# Patient Record
Sex: MALE | Race: WHITE | NOT HISPANIC OR LATINO
[De-identification: names, ages, dates, MRNs, and addresses within clinical notes are randomized per-mention and may not be internally consistent; named-entity substitution may affect disease eponyms.]

---

## 2018-09-05 ENCOUNTER — APPOINTMENT (OUTPATIENT)
Dept: GASTROENTEROLOGY | Facility: CLINIC | Age: 63
End: 2018-09-05
Payer: COMMERCIAL

## 2018-09-05 VITALS
WEIGHT: 225 LBS | SYSTOLIC BLOOD PRESSURE: 130 MMHG | DIASTOLIC BLOOD PRESSURE: 80 MMHG | TEMPERATURE: 97.7 F | RESPIRATION RATE: 16 BRPM | HEIGHT: 70 IN | BODY MASS INDEX: 32.21 KG/M2 | HEART RATE: 71 BPM | OXYGEN SATURATION: 98 %

## 2018-09-05 DIAGNOSIS — D12.6 BENIGN NEOPLASM OF COLON, UNSPECIFIED: ICD-10-CM

## 2018-09-05 DIAGNOSIS — K63.5 POLYP OF COLON: ICD-10-CM

## 2018-09-05 PROCEDURE — 99204 OFFICE O/P NEW MOD 45 MIN: CPT

## 2018-10-09 ENCOUNTER — INPATIENT (INPATIENT)
Facility: HOSPITAL | Age: 63
LOS: 0 days | Discharge: LEFT BEFORE DISCHARGE | End: 2018-10-10
Attending: INTERNAL MEDICINE | Admitting: INTERNAL MEDICINE
Payer: COMMERCIAL

## 2018-10-09 VITALS
OXYGEN SATURATION: 100 % | SYSTOLIC BLOOD PRESSURE: 183 MMHG | DIASTOLIC BLOOD PRESSURE: 101 MMHG | TEMPERATURE: 98 F | HEART RATE: 81 BPM | RESPIRATION RATE: 16 BRPM

## 2018-10-09 DIAGNOSIS — K21.9 GASTRO-ESOPHAGEAL REFLUX DISEASE WITHOUT ESOPHAGITIS: ICD-10-CM

## 2018-10-09 DIAGNOSIS — Z29.9 ENCOUNTER FOR PROPHYLACTIC MEASURES, UNSPECIFIED: ICD-10-CM

## 2018-10-09 DIAGNOSIS — I10 ESSENTIAL (PRIMARY) HYPERTENSION: ICD-10-CM

## 2018-10-09 DIAGNOSIS — E78.5 HYPERLIPIDEMIA, UNSPECIFIED: ICD-10-CM

## 2018-10-09 DIAGNOSIS — R07.9 CHEST PAIN, UNSPECIFIED: ICD-10-CM

## 2018-10-09 DIAGNOSIS — Z98.89 OTHER SPECIFIED POSTPROCEDURAL STATES: Chronic | ICD-10-CM

## 2018-10-09 LAB
ALBUMIN SERPL ELPH-MCNC: 4.4 G/DL — SIGNIFICANT CHANGE UP (ref 3.3–5)
ALP SERPL-CCNC: 105 U/L — SIGNIFICANT CHANGE UP (ref 40–120)
ALT FLD-CCNC: 25 U/L — SIGNIFICANT CHANGE UP (ref 4–41)
APTT BLD: 30.3 SEC — SIGNIFICANT CHANGE UP (ref 27.5–37.4)
AST SERPL-CCNC: 19 U/L — SIGNIFICANT CHANGE UP (ref 4–40)
BASOPHILS # BLD AUTO: 0.07 K/UL — SIGNIFICANT CHANGE UP (ref 0–0.2)
BASOPHILS NFR BLD AUTO: 0.8 % — SIGNIFICANT CHANGE UP (ref 0–2)
BILIRUB SERPL-MCNC: 0.5 MG/DL — SIGNIFICANT CHANGE UP (ref 0.2–1.2)
BUN SERPL-MCNC: 18 MG/DL — SIGNIFICANT CHANGE UP (ref 7–23)
CALCIUM SERPL-MCNC: 9.4 MG/DL — SIGNIFICANT CHANGE UP (ref 8.4–10.5)
CHLORIDE SERPL-SCNC: 98 MMOL/L — SIGNIFICANT CHANGE UP (ref 98–107)
CK MB BLD-MCNC: 2.23 NG/ML — SIGNIFICANT CHANGE UP (ref 1–6.6)
CK SERPL-CCNC: 107 U/L — SIGNIFICANT CHANGE UP (ref 30–200)
CO2 SERPL-SCNC: 25 MMOL/L — SIGNIFICANT CHANGE UP (ref 22–31)
CREAT SERPL-MCNC: 0.98 MG/DL — SIGNIFICANT CHANGE UP (ref 0.5–1.3)
EOSINOPHIL # BLD AUTO: 0.03 K/UL — SIGNIFICANT CHANGE UP (ref 0–0.5)
EOSINOPHIL NFR BLD AUTO: 0.3 % — SIGNIFICANT CHANGE UP (ref 0–6)
GLUCOSE SERPL-MCNC: 112 MG/DL — HIGH (ref 70–99)
HCT VFR BLD CALC: 46.9 % — SIGNIFICANT CHANGE UP (ref 39–50)
HGB BLD-MCNC: 16.1 G/DL — SIGNIFICANT CHANGE UP (ref 13–17)
IMM GRANULOCYTES # BLD AUTO: 0.07 # — SIGNIFICANT CHANGE UP
IMM GRANULOCYTES NFR BLD AUTO: 0.8 % — SIGNIFICANT CHANGE UP (ref 0–1.5)
INR BLD: 1.01 — SIGNIFICANT CHANGE UP (ref 0.88–1.17)
LYMPHOCYTES # BLD AUTO: 1.71 K/UL — SIGNIFICANT CHANGE UP (ref 1–3.3)
LYMPHOCYTES # BLD AUTO: 18.7 % — SIGNIFICANT CHANGE UP (ref 13–44)
MCHC RBC-ENTMCNC: 29.8 PG — SIGNIFICANT CHANGE UP (ref 27–34)
MCHC RBC-ENTMCNC: 34.3 % — SIGNIFICANT CHANGE UP (ref 32–36)
MCV RBC AUTO: 86.9 FL — SIGNIFICANT CHANGE UP (ref 80–100)
MONOCYTES # BLD AUTO: 0.58 K/UL — SIGNIFICANT CHANGE UP (ref 0–0.9)
MONOCYTES NFR BLD AUTO: 6.3 % — SIGNIFICANT CHANGE UP (ref 2–14)
NEUTROPHILS # BLD AUTO: 6.68 K/UL — SIGNIFICANT CHANGE UP (ref 1.8–7.4)
NEUTROPHILS NFR BLD AUTO: 73.1 % — SIGNIFICANT CHANGE UP (ref 43–77)
NRBC # FLD: 0 — SIGNIFICANT CHANGE UP
PLATELET # BLD AUTO: 185 K/UL — SIGNIFICANT CHANGE UP (ref 150–400)
PMV BLD: 10.3 FL — SIGNIFICANT CHANGE UP (ref 7–13)
POTASSIUM SERPL-MCNC: 3.9 MMOL/L — SIGNIFICANT CHANGE UP (ref 3.5–5.3)
POTASSIUM SERPL-SCNC: 3.9 MMOL/L — SIGNIFICANT CHANGE UP (ref 3.5–5.3)
PROT SERPL-MCNC: 8.2 G/DL — SIGNIFICANT CHANGE UP (ref 6–8.3)
PROTHROM AB SERPL-ACNC: 11.2 SEC — SIGNIFICANT CHANGE UP (ref 9.8–13.1)
RBC # BLD: 5.4 M/UL — SIGNIFICANT CHANGE UP (ref 4.2–5.8)
RBC # FLD: 12.5 % — SIGNIFICANT CHANGE UP (ref 10.3–14.5)
SODIUM SERPL-SCNC: 136 MMOL/L — SIGNIFICANT CHANGE UP (ref 135–145)
TROPONIN T, HIGH SENSITIVITY: 18 NG/L — SIGNIFICANT CHANGE UP (ref ?–14)
TROPONIN T, HIGH SENSITIVITY: 19 NG/L — SIGNIFICANT CHANGE UP (ref ?–14)
WBC # BLD: 9.14 K/UL — SIGNIFICANT CHANGE UP (ref 3.8–10.5)
WBC # FLD AUTO: 9.14 K/UL — SIGNIFICANT CHANGE UP (ref 3.8–10.5)

## 2018-10-09 PROCEDURE — 71046 X-RAY EXAM CHEST 2 VIEWS: CPT | Mod: 26

## 2018-10-09 PROCEDURE — 74174 CTA ABD&PLVS W/CONTRAST: CPT | Mod: 26

## 2018-10-09 PROCEDURE — 71275 CT ANGIOGRAPHY CHEST: CPT | Mod: 26

## 2018-10-09 RX ORDER — ATORVASTATIN CALCIUM 80 MG/1
20 TABLET, FILM COATED ORAL AT BEDTIME
Qty: 0 | Refills: 0 | Status: DISCONTINUED | OUTPATIENT
Start: 2018-10-09 | End: 2018-10-10

## 2018-10-09 RX ORDER — ASPIRIN/CALCIUM CARB/MAGNESIUM 324 MG
81 TABLET ORAL DAILY
Qty: 0 | Refills: 0 | Status: DISCONTINUED | OUTPATIENT
Start: 2018-10-09 | End: 2018-10-10

## 2018-10-09 RX ORDER — ENOXAPARIN SODIUM 100 MG/ML
40 INJECTION SUBCUTANEOUS EVERY 24 HOURS
Qty: 0 | Refills: 0 | Status: DISCONTINUED | OUTPATIENT
Start: 2018-10-09 | End: 2018-10-10

## 2018-10-09 RX ORDER — ASPIRIN/CALCIUM CARB/MAGNESIUM 324 MG
162 TABLET ORAL ONCE
Qty: 0 | Refills: 0 | Status: COMPLETED | OUTPATIENT
Start: 2018-10-09 | End: 2018-10-09

## 2018-10-09 RX ORDER — NITROGLYCERIN 6.5 MG
0.4 CAPSULE, EXTENDED RELEASE ORAL ONCE
Qty: 0 | Refills: 0 | Status: COMPLETED | OUTPATIENT
Start: 2018-10-09 | End: 2018-10-09

## 2018-10-09 RX ORDER — FENTANYL CITRATE 50 UG/ML
50 INJECTION INTRAVENOUS ONCE
Qty: 0 | Refills: 0 | Status: DISCONTINUED | OUTPATIENT
Start: 2018-10-09 | End: 2018-10-09

## 2018-10-09 RX ORDER — PANTOPRAZOLE SODIUM 20 MG/1
40 TABLET, DELAYED RELEASE ORAL
Qty: 0 | Refills: 0 | Status: DISCONTINUED | OUTPATIENT
Start: 2018-10-09 | End: 2018-10-10

## 2018-10-09 RX ORDER — LOSARTAN POTASSIUM 100 MG/1
25 TABLET, FILM COATED ORAL DAILY
Qty: 0 | Refills: 0 | Status: DISCONTINUED | OUTPATIENT
Start: 2018-10-09 | End: 2018-10-10

## 2018-10-09 RX ADMIN — Medication 0.4 MILLIGRAM(S): at 22:00

## 2018-10-09 RX ADMIN — FENTANYL CITRATE 50 MICROGRAM(S): 50 INJECTION INTRAVENOUS at 20:06

## 2018-10-09 RX ADMIN — ATORVASTATIN CALCIUM 20 MILLIGRAM(S): 80 TABLET, FILM COATED ORAL at 22:00

## 2018-10-09 RX ADMIN — FENTANYL CITRATE 50 MICROGRAM(S): 50 INJECTION INTRAVENOUS at 18:36

## 2018-10-09 RX ADMIN — Medication 0.4 MILLIGRAM(S): at 20:06

## 2018-10-09 RX ADMIN — Medication 162 MILLIGRAM(S): at 18:22

## 2018-10-09 NOTE — H&P ADULT - FAMILY HISTORY
Family history of heart attack     Sibling  Still living? Yes, Estimated age: Age Unknown  Family history of CABG, Age at diagnosis: Age Unknown

## 2018-10-09 NOTE — ED PROVIDER NOTE - OBJECTIVE STATEMENT
63yom w/ HTN, HLD former smoker had an acute onset at rest of heaviness in the left side of his chest radiating to the left arm and shoulder, associated w/ nausea, diaphoresis and shortness of breath, symptoms worsened w/ exertion, persistent since onset. Last stress/cath about 5 years ago. Strong family hx of cardiac disease. No fevers, chills, cough or other recent illness.

## 2018-10-09 NOTE — H&P ADULT - HISTORY OF PRESENT ILLNESS
62 y/o male with a PMHx of HTN, HLD, and former smoker, with a strong family history of CAD, presents to ED with chest pain. Pt was at home watching television this afternoon when he developed acute onset, non-pleuritic, non-exertional, left sided chest pain radiating under left breast to the left arm and to the upper back. Pt cannot identify any provoking factors and says he was sitting down when the symptoms started. Pt describes the chest pain as a "pressure" and heaviness. Pt admits to associated shortness of breath, nausea and diaphoresis when his chest pain started. Pt took a baby Aspirin at home with mild alleviation of his symptoms, but they did not completely go away. Pt underwent cardiac cath 5 years ago, which was normal. He does not remember when his last stress test was but follows up with his cardiologist Dr. Long every 6 months because of his family history. Pt does state that he gets heartburn very often. Pt denies fever, chills, recent travel, headache, dizziness, visual deficits, orthopnea, palpitations, abdominal pain, V/D/C, hematochezia, melena, dysuria, hematuria, LOC, syncope, peripheral edema. Upon arrival to ED, EKG: NSR at 70 bpm with LAD. CE x2: Trop 18-->19. Pt was given Fentanyl and his symptoms improved.

## 2018-10-09 NOTE — ED ADULT NURSE NOTE - NSIMPLEMENTINTERV_GEN_ALL_ED
Implemented All Universal Safety Interventions:  [place] to call system. Call bell, personal items and telephone within reach. Instruct patient to call for assistance. Room bathroom lighting operational. Non-slip footwear when patient is off stretcher. Physically safe environment: no spills, clutter or unnecessary equipment. Stretcher in lowest position, wheels locked, appropriate side rails in place.

## 2018-10-09 NOTE — ED PROVIDER NOTE - ATTENDING CONTRIBUTION TO CARE
I, Jennifer Cabot, MD, have performed a history and physical exam of the patient and discussed their management with the resident. I reviewed the resident's note and agree with the documented findings and plan of care. My medical decision making and observations are found above.    Cabot: 63M with PMH Of HTN, HLD former smoker, neg cath 5 years ago, here with L sided CP starting while lying on the couch.  Associated with nausea and diaphoresis.  Exertional.  Has FH of MIs in the 40s in his father and MIs in his brother in his 50s.  On exam, HDS, NAD, MMM, eyes clear, lungs CTAB, heart sounds normal, abd soft, NT, ND, no CVAT, LEs without edema, wwp, skin normal temperature and color, neuro: alert and oriented, no focal deficits, pulses 2+ bilat.  EKG unchanged from prior.  Will send basic labs, coags, trop, CXR, ASA, tx pain, likely admit.

## 2018-10-09 NOTE — H&P ADULT - NEGATIVE CARDIOVASCULAR SYMPTOMS
no palpitations/no peripheral edema/no dyspnea on exertion/no orthopnea/no paroxysmal nocturnal dyspnea/no claudication

## 2018-10-09 NOTE — ED PROVIDER NOTE - MEDICAL DECISION MAKING DETAILS
Cabot: 63M with PMH Of HTN, HLD former smoker, neg cath 5 years ago, here with L sided CP starting while lying on the couch.  Associated with nausea and diaphoresis.  Exertional.  Has FH of MIs in the 40s in his father and MIs in his brother in his 50s.  On exam, HDS, NAD, MMM, eyes clear, lungs CTAB, heart sounds normal, abd soft, NT, ND, no CVAT, LEs without edema, wwp, skin normal temperature and color, neuro: alert and oriented, no focal deficits, pulses 2+ bilat.  EKG unchanged from prior.  Will send basic labs, coags, trop, CXR, ASA, tx pain, likely admit.

## 2018-10-09 NOTE — ED PROVIDER NOTE - CARE PLAN
Principal Discharge DX:	Chest pain  Secondary Diagnosis:	Shortness of breath  Secondary Diagnosis:	HTN (hypertension)

## 2018-10-09 NOTE — H&P ADULT - NEUROLOGICAL DETAILS
responds to verbal commands/cranial nerves intact/sensation intact/normal strength/responds to pain/alert and oriented x 3

## 2018-10-09 NOTE — ED ADULT NURSE REASSESSMENT NOTE - NS ED NURSE REASSESS COMMENT FT1
Pt complaining of sudden nausea. Tele SERGE Ram notified and advised to give Zofran and advised no repeat EKG needed at this time.
Report given to [facility] 2 ABELINO Serrato.
Upon reassessment, pt complaining of left-sided dull chest pain. Lung sounds are clear. Pt denies SOB, difficulty breathing, N&V, or ABD pain at this time.
Report received from keren Zurita. Pt In room 1. Pt states no relief from first dose of nitro at this time. Pt denies SOB, difficulty breathing, N&V at this time.
upon reassessment, pt states chest pain has been partially relieved and he is more comfortable when laying down. Pt also states he is no longer nauseas.

## 2018-10-09 NOTE — H&P ADULT - RS GEN PE MLT RESP DETAILS PC
airway patent/good air movement/no wheezes/respirations non-labored/clear to auscultation bilaterally/breath sounds equal/no intercostal retractions/no rales/no chest wall tenderness/no rhonchi

## 2018-10-09 NOTE — H&P ADULT - ASSESSMENT
62 y/o male with a PMHx of HTN, HLD, and former smoker, with a strong family history of CAD, presents to ED with chest pain.

## 2018-10-09 NOTE — H&P ADULT - NEGATIVE GASTROINTESTINAL SYMPTOMS
no melena/no hematochezia/no abdominal pain/no flatulence/no constipation/no vomiting/no diarrhea/no change in bowel habits

## 2018-10-09 NOTE — H&P ADULT - PROBLEM SELECTOR PLAN 1
Admit to telemetry, serial cardiac enzymes, serial EKGs  Check CBC, CMP, TSH, FLP, HgA1C  Chest pain atypical  Cardiac cath from 2013 shows normal coronary arteries  CT angio chest ordered by ED; will follow up results  Will call cardiology consult with Dr. Long in AM  Further ischemic workup as per cardiology  F/U MD note

## 2018-10-09 NOTE — ED ADULT NURSE NOTE - OBJECTIVE STATEMENT
pt received to , aox3, pt c/o chest pain since yesterday, worse upon movement. SL placed, labs sent, v/s as noted.  MD at bedside.  awaiting further orders, nathalia barnett

## 2018-10-09 NOTE — H&P ADULT - ATTENDING COMMENTS
seen and examined  above HPI, PMH, SH, FH, Meds, Allergies and ROS noted and personally confirmed as accurate by me  PE as above  PHYSICAL EXAM: vital signs as above  in no apparent distress  HEENT: JOSE EOMI  Neck: Supple, no JVD, no thyromegaly  Lungs: no rhonchi, no wheeze, no crackles  CVS: S1 S2 no M/R/G  Abdomen: no tenderness, no organomegaly, BS present  Neuro: AO x 3 no focal weakness, no sensory abnormalities  Psych: appropriate affect  Skin: warm, dry  Ext: no cyanosis or clubbing, no edema  Msk: no joint swelling or deformities  Back: no CVA tenderness, no kyphosis/scoliosis       d/w Dr Long cardiology   high risk for CAD  discharge home after stress if negative    discussed with patient in detail, all questions answered   discussed with wife at bedside in detail

## 2018-10-09 NOTE — ED ADULT TRIAGE NOTE - CHIEF COMPLAINT QUOTE
Pt c/o sudden onset chest pain., nausea,  sob, L arm numbness,  dizziness, and diaphoreses.  Pt took asp 1 tab

## 2018-10-09 NOTE — ED PROVIDER NOTE - PROGRESS NOTE DETAILS
Mg: Chest pain improving but not fully resolved, troponin 18->19, Heart score 6. Based on pt's risk profile, warrants admission on tele for ACS r/o. Discussed w/ pt's cardiologist Dr. Long, can admit to [facility]. Dr. Mason requests CTA to r/o dissection, will follow up result.

## 2018-10-09 NOTE — H&P ADULT - NEGATIVE OPHTHALMOLOGIC SYMPTOMS
no loss of vision R/no blurred vision L/no loss of vision L/no diplopia/no photophobia/no pain L/no pain R/no blurred vision R

## 2018-10-10 VITALS
HEART RATE: 78 BPM | SYSTOLIC BLOOD PRESSURE: 120 MMHG | TEMPERATURE: 98 F | RESPIRATION RATE: 18 BRPM | OXYGEN SATURATION: 100 % | DIASTOLIC BLOOD PRESSURE: 69 MMHG

## 2018-10-10 LAB
BUN SERPL-MCNC: 18 MG/DL — SIGNIFICANT CHANGE UP (ref 7–23)
CALCIUM SERPL-MCNC: 8.8 MG/DL — SIGNIFICANT CHANGE UP (ref 8.4–10.5)
CHLORIDE SERPL-SCNC: 99 MMOL/L — SIGNIFICANT CHANGE UP (ref 98–107)
CHOLEST SERPL-MCNC: 150 MG/DL — SIGNIFICANT CHANGE UP (ref 120–199)
CO2 SERPL-SCNC: 23 MMOL/L — SIGNIFICANT CHANGE UP (ref 22–31)
CREAT SERPL-MCNC: 0.96 MG/DL — SIGNIFICANT CHANGE UP (ref 0.5–1.3)
GLUCOSE SERPL-MCNC: 157 MG/DL — HIGH (ref 70–99)
HBA1C BLD-MCNC: 5.9 % — HIGH (ref 4–5.6)
HCT VFR BLD CALC: 45.1 % — SIGNIFICANT CHANGE UP (ref 39–50)
HDLC SERPL-MCNC: 38 MG/DL — SIGNIFICANT CHANGE UP (ref 35–55)
HGB BLD-MCNC: 15.9 G/DL — SIGNIFICANT CHANGE UP (ref 13–17)
LIPID PNL WITH DIRECT LDL SERPL: 107 MG/DL — SIGNIFICANT CHANGE UP
MAGNESIUM SERPL-MCNC: 1.9 MG/DL — SIGNIFICANT CHANGE UP (ref 1.6–2.6)
MCHC RBC-ENTMCNC: 30.5 PG — SIGNIFICANT CHANGE UP (ref 27–34)
MCHC RBC-ENTMCNC: 35.3 % — SIGNIFICANT CHANGE UP (ref 32–36)
MCV RBC AUTO: 86.6 FL — SIGNIFICANT CHANGE UP (ref 80–100)
NRBC # FLD: 0 — SIGNIFICANT CHANGE UP
PLATELET # BLD AUTO: 182 K/UL — SIGNIFICANT CHANGE UP (ref 150–400)
PMV BLD: 10.2 FL — SIGNIFICANT CHANGE UP (ref 7–13)
POTASSIUM SERPL-MCNC: 3.9 MMOL/L — SIGNIFICANT CHANGE UP (ref 3.5–5.3)
POTASSIUM SERPL-SCNC: 3.9 MMOL/L — SIGNIFICANT CHANGE UP (ref 3.5–5.3)
RBC # BLD: 5.21 M/UL — SIGNIFICANT CHANGE UP (ref 4.2–5.8)
RBC # FLD: 12.6 % — SIGNIFICANT CHANGE UP (ref 10.3–14.5)
SODIUM SERPL-SCNC: 136 MMOL/L — SIGNIFICANT CHANGE UP (ref 135–145)
TRIGL SERPL-MCNC: 113 MG/DL — SIGNIFICANT CHANGE UP (ref 10–149)
TSH SERPL-MCNC: 1.27 UIU/ML — SIGNIFICANT CHANGE UP (ref 0.27–4.2)
WBC # BLD: 9.37 K/UL — SIGNIFICANT CHANGE UP (ref 3.8–10.5)
WBC # FLD AUTO: 9.37 K/UL — SIGNIFICANT CHANGE UP (ref 3.8–10.5)

## 2018-10-10 PROCEDURE — 93306 TTE W/DOPPLER COMPLETE: CPT | Mod: 26

## 2018-10-10 RX ORDER — ACETAMINOPHEN 500 MG
650 TABLET ORAL EVERY 6 HOURS
Qty: 0 | Refills: 0 | Status: DISCONTINUED | OUTPATIENT
Start: 2018-10-10 | End: 2018-10-10

## 2018-10-10 RX ORDER — ASPIRIN/CALCIUM CARB/MAGNESIUM 324 MG
1 TABLET ORAL
Qty: 0 | Refills: 0 | DISCHARGE
Start: 2018-10-10

## 2018-10-10 RX ORDER — INFLUENZA VIRUS VACCINE 15; 15; 15; 15 UG/.5ML; UG/.5ML; UG/.5ML; UG/.5ML
0.5 SUSPENSION INTRAMUSCULAR ONCE
Qty: 0 | Refills: 0 | Status: COMPLETED | OUTPATIENT
Start: 2018-10-10 | End: 2018-10-10

## 2018-10-10 RX ORDER — MORPHINE SULFATE 50 MG/1
2 CAPSULE, EXTENDED RELEASE ORAL ONCE
Qty: 0 | Refills: 0 | Status: DISCONTINUED | OUTPATIENT
Start: 2018-10-10 | End: 2018-10-10

## 2018-10-10 RX ADMIN — ENOXAPARIN SODIUM 40 MILLIGRAM(S): 100 INJECTION SUBCUTANEOUS at 04:18

## 2018-10-10 RX ADMIN — LOSARTAN POTASSIUM 25 MILLIGRAM(S): 100 TABLET, FILM COATED ORAL at 06:59

## 2018-10-10 RX ADMIN — Medication 650 MILLIGRAM(S): at 09:44

## 2018-10-10 RX ADMIN — Medication 81 MILLIGRAM(S): at 11:34

## 2018-10-10 RX ADMIN — PANTOPRAZOLE SODIUM 40 MILLIGRAM(S): 20 TABLET, DELAYED RELEASE ORAL at 06:59

## 2018-10-10 RX ADMIN — Medication 650 MILLIGRAM(S): at 11:18

## 2018-10-10 RX ADMIN — MORPHINE SULFATE 2 MILLIGRAM(S): 50 CAPSULE, EXTENDED RELEASE ORAL at 04:17

## 2018-10-10 NOTE — DISCHARGE NOTE ADULT - CARE PLAN
Principal Discharge DX:	Chest pain  Goal:	prevent further symptoms  Assessment and plan of treatment:	Follow up with Dr Long in one week  low salt, low fat, low cholesterol  Follow up with your [facility] doctor regarding 3mm right upper lobe nodule and sclerotic lesion on C6 - cervical spine  continue aspirin for CAD prophylaxis  Secondary Diagnosis:	GERD (gastroesophageal reflux disease)  Assessment and plan of treatment:	nexium  Secondary Diagnosis:	HLD (hyperlipidemia)  Assessment and plan of treatment:	lipitor  Secondary Diagnosis:	HTN (hypertension)  Assessment and plan of treatment:	low salt, low fat, low cholesterol  continue losartan Principal Discharge DX:	Chest pain  Goal:	prevent further symptoms  Assessment and plan of treatment:	Follow up with Dr Long in one week  low salt, low fat, low cholesterol  Follow up with your [facility] doctor regarding 3mm right upper lobe nodule and sclerotic lesion on C6 - cervical spine  continue aspirin for CAD prophylaxis  Secondary Diagnosis:	GERD (gastroesophageal reflux disease)  Goal:	To prevent acid reflux, heartburn and chest pain.  Assessment and plan of treatment:	nexium, Avoid fatty, fried foods, acidic foods such as tomatoes, lime and chocolate. Continue to take your medications as prescribed, exercise daily and weight loss.  Secondary Diagnosis:	HLD (hyperlipidemia)  Goal:	To maintain normal cholesterol levels to prevent stroke, coronary artery disease, peripheral vascular disease and heart attacks.  Assessment and plan of treatment:	lipitor;  Low fat diet, exercise daily and continue current medications. Follow up with primary care physician and cardiologist for management.  Secondary Diagnosis:	HTN (hypertension)  Goal:	To maintain a normal blood pressure to prevent heart attack, stroke and renal failure.  Assessment and plan of treatment:	low salt, low fat, low cholesterol  continue losartan  Low sodium and fat diet, continue anti-hypertensive medications, and follow up with primary care physician.

## 2018-10-10 NOTE — DISCHARGE NOTE ADULT - ADDITIONAL INSTRUCTIONS
3 mm right upper lobe pulmonary nodule. chest CT follow-up in 12 months recommended in high-risk patients as per Fleischner Society guidelines. Well-defined sclerotic lesion at the posterosuperior endplate of C6 has benign features and may represent a bone island. Consider bone scan to exclude presence of metabolic activity.

## 2018-10-10 NOTE — DISCHARGE NOTE ADULT - HOSPITAL COURSE
64 y/o male with a PMHx of HTN, HLD, and former smoker, with a strong family history of CAD, presents to ED with chest pain.+ Chest pain- CT angio negative for dissection. Stress and echocardiogram, showed  Patient stable for discharge after echo/stress stest      tests  EKG: NSR at 70 bpm with LAD  CE x2: Trop 18-->19  10/9 CXR: CRISTINO  10/9 CT angio chest, abdomen, pelvis: No aortic dissection. 3 mm right upper lobe pulmonary nodule. Optional chest CT follow-up in 12 months recommended in high-risk patients as per Fleischner Society guidelines. Well-defined sclerotic lesion at the posterosuperior endplate of C6 has benign features and may represent a bone island. Consider bone scan to exclude presence of metabolic activity. 62 y/o male with a PMHx of HTN, HLD, and former smoker, with a strong family history of CAD, presents to ED with chest pain.+ Chest pain- CT angio negative for dissection. Stress and echocardiogram, showed  Patient stable for discharge after echo/stress stest  tests  EKG: NSR at 70 bpm with LAD  CE x2: Trop 18-->19  10/9 CXR: CRISTINO  10/9 CT angio chest, abdomen, pelvis: No aortic dissection. 3 mm right upper lobe pulmonary nodule. Optional chest CT follow-up in 12 months recommended in high-risk patients as per Fleischner Society guidelines. Well-defined sclerotic lesion at the posterosuperior endplate of C6 has benign features and may represent a bone island. Consider bone scan to exclude presence of metabolic activity.  10/10- Echo- Mitral annular calcification, otherwise normal mitral  valve. Minimal mitral regurgitation.  2. Normal left ventricular internal dimensions and wall  thicknesses.  3. Endocardium not well visualized; unable to evaluate left  ventricular systolic function.  4. The right ventricle is not well visualized; grossly  normal right ventricular systolic function.  Consider limited repeat imaging with IV contrast  administration for improved evaluation of LV systolic  function, if clinically indicated. 62 y/o male with a PMHx of HTN, HLD, and former smoker, with a strong family history of CAD, presents to ED with chest pain.+ Chest pain- CT angio negative for dissection. Stress and echocardiogram, showed normal Echo. Patient unwilling to stay for the second part of the stress test. Patient warned of risks including but not limited to heart attack, stroke, syncope and sudden death. Pt. has capacity and is competent to make decisions. Pt wants to leave AMA anyway. Will follow up with Dr Long. Witnessed by second PA.       tests  EKG: NSR at 70 bpm with LAD  CE x2: Trop 18-->19  10/9 CXR: CRISTINO  10/9 CT angio chest, abdomen, pelvis: No aortic dissection. 3 mm right upper lobe pulmonary nodule. Optional chest CT follow-up in 12 months recommended in high-risk patients as per Fleischner Society guidelines. Well-defined sclerotic lesion at the posterosuperior endplate of C6 has benign features and may represent a bone island. Consider bone scan to exclude presence of metabolic activity.  10/10- Echo- Mitral annular calcification, otherwise normal mitral  valve. Minimal mitral regurgitation.  2. Normal left ventricular internal dimensions and wall  thicknesses.  3. Endocardium not well visualized; unable to evaluate left  ventricular systolic function.  4. The right ventricle is not well visualized; grossly  normal right ventricular systolic function.  Consider limited repeat imaging with IV contrast  administration for improved evaluation of LV systolic  function, if clinically indicated. 64 y/o male with a PMHx of HTN, HLD, and former smoker, with a strong family history of CAD, presents to ED with chest pain.+ Chest pain- CT angio negative for dissection. Stress and echocardiogram, showed normal Echo. Patient unwilling to stay for the second part of the stress test. Patient warned of risks including but not limited to heart attack, stroke, syncope and sudden death. Pt. has capacity and is competent to make decisions. Pt wants to leave AMA anyway. Will follow up with Dr Long. Patient left without signing ama papers or receiving discharge papers.       tests  EKG: NSR at 70 bpm with LAD  CE x2: Trop 18-->19  10/9 CXR: CRISTINO  10/9 CT angio chest, abdomen, pelvis: No aortic dissection. 3 mm right upper lobe pulmonary nodule. Optional chest CT follow-up in 12 months recommended in high-risk patients as per Fleischner Society guidelines. Well-defined sclerotic lesion at the posterosuperior endplate of C6 has benign features and may represent a bone island. Consider bone scan to exclude presence of metabolic activity.  10/10- Echo- Mitral annular calcification, otherwise normal mitral  valve. Minimal mitral regurgitation.  2. Normal left ventricular internal dimensions and wall  thicknesses.  3. Endocardium not well visualized; unable to evaluate left  ventricular systolic function.  4. The right ventricle is not well visualized; grossly  normal right ventricular systolic function.  Consider limited repeat imaging with IV contrast  administration for improved evaluation of LV systolic  function, if clinically indicated.

## 2018-10-10 NOTE — DISCHARGE NOTE ADULT - CARE PROVIDER_API CALL
Сергей Long (MD), Cardiovascular Disease; Internal Medicine  [address] [place] [address]  Phone: [phone]  Fax: [phone]

## 2018-10-10 NOTE — DISCHARGE NOTE ADULT - MEDICATION SUMMARY - MEDICATIONS TO TAKE
I will START or STAY ON the medications listed below when I get home from the hospital:    Aspi-Cor 81 mg oral delayed release tablet  -- 1 tab(s) by mouth once a day  -- Indication: For CAD prophyaxis    losartan 25 mg oral tablet  -- 1 tab(s) by mouth once a day  -- Indication: For HTN (hypertension)    atorvastatin 20 mg oral tablet  -- 1 tab(s) by mouth once a day (at bedtime)  -- Indication: For Hyperlidemia    NexIUM 40 mg oral delayed release capsule  -- 1 cap(s) by mouth once a day  -- Indication: For GERD (gastroesophageal reflux disease)

## 2018-10-10 NOTE — CONSULT NOTE ADULT - ASSESSMENT
Chest pain, mostly atypical in nature, for several hours ongoing, with features and associated symptoms suggestive of musculoskeletal etiology or cervical radiculopathy (reproducibility to palp, positional exacerbation, neck pain, left arm paraesthesias).  No current evidence of MI/ACS.  Multiple CV risk factors    Rec:    Pain control with NSAID  Exercise nuc stress test to eval for myocardial ischemia  Echocardiogram to eval LV function and rule out pericardial effusion.  Contin aspirin, statin, antihypertensive.  If cardiac work up neg may discharge home with outpatient follow up/neuro or ortho eval.

## 2018-10-10 NOTE — CONSULT NOTE ADULT - SUBJECTIVE AND OBJECTIVE BOX
CHIEF COMPLAINT:  chest pain    HISTORY OF PRESENT ILLNESS:  62 yo M well-known to me.  Mult CV risk factors.  Compl of acute onset left sided chest pain starting yesterday afternoon while in bed watching tv, felt like it began when he got up.  + positional in nature.  Pain localized to under left breast.   + associated left sided neck pain, and radiation of chest pain to back and left arm.  + associated paraesthesias of left arm and fingers.  Symptoms have been constant, mildly improved after morphine in ER.  No improvement with ntg.   Still with pain now but improved from 10/10 intensity to 4/10.  + Had assoc shortness of breath initially.  + sweats and chills.  No recent fever or cough.  No URI symptoms.  No lower ext edema.    Pt reports he had similar chest pain 5 years ago and was referred for cardiac cath at that time that was normal.  He does not exercise but is active, and had not had any recent exertional chest pain.    PAST MEDICAL & SURGICAL HISTORY:  GERD (gastroesophageal reflux disease)  HLD (hyperlipidemia)  HTN (hypertension)  Left ventricular hypertrophy  Prediabetes  History of hand surgery  Obesity            MEDICATIONS:  MEDICATIONS  (STANDING):  aspirin enteric coated 81 milliGRAM(s) Oral daily  atorvastatin 20 milliGRAM(s) Oral at bedtime  enoxaparin Injectable 40 milliGRAM(s) SubCutaneous every 24 hours  losartan 25 milliGRAM(s) Oral daily  pantoprazole    Tablet 40 milliGRAM(s) Oral before breakfast    MEDICATIONS  (PRN):      FAM HISTORY  FAMILY HISTORY:  Family history of CABG (Sibling)  Family history of heart attack - father.      SOCIAL HISTORY:    Social History:    Marital Status:  (  X )    (   ) Single    (   )    (  )   Occupation:   Lives with: (  ) alone  (  ) children   ( X ) spouse   (  ) parents  (  ) other    Substance Use (street drugs): ( X ) never used  (  ) other:  Tobacco Usage:  (  X ) never smoked   (   ) former smoker   (   ) current smoker  (     ) pack years  (        ) last cigarette date  Alcohol Usage:    (     ) Advanced Directives: (     ) None    (      ) DNR    (     ) DNI    (     ) Health Care Proxy:     ALLERGIES:  Allergies    No Known Allergies      REVIEW OF SYSTEMS:      Musculoskeletal:	  + chronic left shoulder pain/"pinched nerve"    PHYSICAL EXAM:  T(C): 36.8 (10-10-18 @ 07:02), Max: 36.8 (10-10-18 @ 07:02)  HR: 73 (10-10-18 @ 07:02) (64 - 87)  BP: 142/87 (10-10-18 @ 07:02) (130/74 - 183/101)  RR: 18 (10-10-18 @ 07:02) (15 - 18)  SpO2: 97% (10-10-18 @ 07:02) (95% - 100%)  Wt(kg): --  I&O's Summary      Appearance:  Comf, NAD	  HEENT:   Normal oral mucosa, PERRL, EOMI	  Lymphatic: No lymphadenopathy  Cardiovascular: Normal S1 S2, no m/g/r  Neck: JVP normal  Respiratory: Lungs clear to auscultation	  Gastrointestinal:  Soft, Non-tender, + BS	  Skin: No rashes, No ecchymoses, No cyanosis	  Neurologic: Non-focal, awake , alert , oriented   Extremities: Normal range of motion, No clubbing, cyanosis or edema  Vascular: Peripheral pulses palpable 2+ bilaterally  + reproducible left anterior chest wall tenderness to palpation.    	    ECG:  	NSR LAHB, no acute ischemic changes, no change from prior  RADIOLOGY:  < from: CT Angio Chest w/ IV Cont (10.09.18 @ 23:30) >    IMPRESSION: No aortic dissection.    3 mm right upper lobe pulmonary nodule. Optional chest CT follow-up in 12   months recommended in high-risk patients as per Fleischner Society   guidelines.    Well-defined sclerotic lesion at the posterosuperior endplate of C6 has   benign features and may represent a bone island. Consider bone scan to   exclude presence of metabolic activity.              ALTAGRACIA COLVIN M.D., RADIOLOGY RESIDENT  This document has been electronically signed.  ASHWINI CHAN M.D., ATTENDING RADIOLOGIST  This document has been electronically signed. Oct 10 2018  1:27AM    < end of copied text >  	  	  LABS:	 	    CARDIAC MARKERS:      CKMB: 2.23 ng/mL (10-09 @ 18:00)                              15.9   9.37  )-----------( 182      ( 10 Oct 2018 05:05 )             45.1     10-10    136  |  99  |  18  ----------------------------<  157<H>  3.9   |  23  |  0.96    Ca    8.8      10 Oct 2018 05:05  Mg     1.9     10-10    TPro  8.2  /  Alb  4.4  /  TBili  0.5  /  DBili  x   /  AST  19  /  ALT  25  /  AlkPhos  105  10-09    Alanine Aminotransferase (ALT/SGPT): 25 u/L (10-09 @ 18:00)  HgA1c: Hemoglobin A1C, Whole Blood: 5.9 % (10-10 @ 05:05)    TSH: Thyroid Stimulating Hormone, Serum: 1.27 uIU/mL (10-10 @ 05:05)

## 2018-10-10 NOTE — DISCHARGE NOTE ADULT - PATIENT PORTAL LINK FT
You can access the [facility] Patient Portal, offered by [facility], by registering with the following website: [email or web address]

## 2018-10-10 NOTE — DISCHARGE NOTE ADULT - INSTRUCTIONS
3 mm right upper lobe pulmonary nodule. chest CT follow-up in 12 months recommended in high-risk patients as per Fleischner Society guidelines. Well-defined sclerotic lesion at the posterosuperior endplate of C6 has benign features and may represent a bone island. Consider bone scan to exclude presence of metabolic activity. low salt &  low cholesterol

## 2018-10-10 NOTE — DISCHARGE NOTE ADULT - PLAN OF CARE
prevent further symptoms Follow up with Dr Long in one week  low salt, low fat, low cholesterol  Follow up with your [facility] doctor regarding 3mm right upper lobe nodule and sclerotic lesion on C6 - cervical spine  continue aspirin for CAD prophylaxis nexium lipitor low salt, low fat, low cholesterol  continue losartan To maintain normal cholesterol levels to prevent stroke, coronary artery disease, peripheral vascular disease and heart attacks. lipitor;  Low fat diet, exercise daily and continue current medications. Follow up with primary care physician and cardiologist for management. To maintain a normal blood pressure to prevent heart attack, stroke and renal failure. low salt, low fat, low cholesterol  continue losartan  Low sodium and fat diet, continue anti-hypertensive medications, and follow up with primary care physician. To prevent acid reflux, heartburn and chest pain. nexium, Avoid fatty, fried foods, acidic foods such as tomatoes, lime and chocolate. Continue to take your medications as prescribed, exercise daily and weight loss.

## 2018-11-15 PROBLEM — K21.9 GASTRO-ESOPHAGEAL REFLUX DISEASE WITHOUT ESOPHAGITIS: Chronic | Status: ACTIVE | Noted: 2018-10-09

## 2018-12-13 ENCOUNTER — APPOINTMENT (OUTPATIENT)
Dept: GASTROENTEROLOGY | Facility: HOSPITAL | Age: 63
End: 2018-12-13

## 2019-11-03 ENCOUNTER — MEDICATION RENEWAL (OUTPATIENT)
Age: 64
End: 2019-11-03

## 2019-11-05 ENCOUNTER — MEDICATION RENEWAL (OUTPATIENT)
Age: 64
End: 2019-11-05

## 2019-11-05 RX ORDER — POLYETHYLENE GLYCOL 3350, SODIUM SULFATE, SODIUM CHLORIDE, POTASSIUM CHLORIDE, ASCORBIC ACID, SODIUM ASCORBATE 7.5-2.691G
100 KIT ORAL
Qty: 1 | Refills: 0 | Status: DISCONTINUED | COMMUNITY
Start: 2018-09-05 | End: 2019-11-05

## 2019-11-07 ENCOUNTER — APPOINTMENT (OUTPATIENT)
Dept: CT IMAGING | Facility: IMAGING CENTER | Age: 64
End: 2019-11-07
Payer: MEDICARE

## 2019-11-07 ENCOUNTER — OUTPATIENT (OUTPATIENT)
Dept: OUTPATIENT SERVICES | Facility: HOSPITAL | Age: 64
LOS: 1 days | End: 2019-11-07
Payer: MEDICARE

## 2019-11-07 ENCOUNTER — TRANSCRIPTION ENCOUNTER (OUTPATIENT)
Age: 64
End: 2019-11-07

## 2019-11-07 DIAGNOSIS — Z98.89 OTHER SPECIFIED POSTPROCEDURAL STATES: Chronic | ICD-10-CM

## 2019-11-07 DIAGNOSIS — Z00.8 ENCOUNTER FOR OTHER GENERAL EXAMINATION: ICD-10-CM

## 2019-11-07 PROCEDURE — 71250 CT THORAX DX C-: CPT | Mod: 26

## 2019-11-07 PROCEDURE — 71250 CT THORAX DX C-: CPT

## 2020-02-06 ENCOUNTER — OUTPATIENT (OUTPATIENT)
Dept: OUTPATIENT SERVICES | Facility: HOSPITAL | Age: 65
LOS: 1 days | Discharge: HOME | End: 2020-02-06
Payer: MEDICARE

## 2020-02-06 ENCOUNTER — RESULT REVIEW (OUTPATIENT)
Age: 65
End: 2020-02-06

## 2020-02-06 ENCOUNTER — APPOINTMENT (OUTPATIENT)
Dept: GASTROENTEROLOGY | Facility: HOSPITAL | Age: 65
End: 2020-02-06

## 2020-02-06 VITALS
RESPIRATION RATE: 13 BRPM | DIASTOLIC BLOOD PRESSURE: 76 MMHG | SYSTOLIC BLOOD PRESSURE: 121 MMHG | HEART RATE: 82 BPM | OXYGEN SATURATION: 95 %

## 2020-02-06 VITALS
SYSTOLIC BLOOD PRESSURE: 138 MMHG | TEMPERATURE: 97 F | HEIGHT: 70 IN | WEIGHT: 222.01 LBS | HEART RATE: 74 BPM | OXYGEN SATURATION: 95 % | RESPIRATION RATE: 14 BRPM | DIASTOLIC BLOOD PRESSURE: 75 MMHG

## 2020-02-06 DIAGNOSIS — Z98.89 OTHER SPECIFIED POSTPROCEDURAL STATES: Chronic | ICD-10-CM

## 2020-02-06 DIAGNOSIS — Z12.11 ENCOUNTER FOR SCREENING FOR MALIGNANT NEOPLASM OF COLON: ICD-10-CM

## 2020-02-06 PROCEDURE — 88312 SPECIAL STAINS GROUP 1: CPT | Mod: 26

## 2020-02-06 PROCEDURE — 43239 EGD BIOPSY SINGLE/MULTIPLE: CPT

## 2020-02-06 PROCEDURE — 45385 COLONOSCOPY W/LESION REMOVAL: CPT

## 2020-02-06 PROCEDURE — 88305 TISSUE EXAM BY PATHOLOGIST: CPT | Mod: 26

## 2020-02-06 PROCEDURE — 45380 COLONOSCOPY AND BIOPSY: CPT | Mod: 59

## 2020-02-06 RX ORDER — SODIUM CHLORIDE 9 MG/ML
1000 INJECTION, SOLUTION INTRAVENOUS
Refills: 0 | Status: DISCONTINUED | OUTPATIENT
Start: 2020-02-06 | End: 2020-02-22

## 2020-02-12 LAB — SURGICAL PATHOLOGY STUDY: SIGNIFICANT CHANGE UP

## 2021-01-11 NOTE — PATIENT PROFILE ADULT - FALL HARM RISK
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 other

## 2021-07-07 ENCOUNTER — NON-APPOINTMENT (OUTPATIENT)
Age: 66
End: 2021-07-07

## 2021-07-07 ENCOUNTER — APPOINTMENT (OUTPATIENT)
Dept: CARDIOLOGY | Facility: CLINIC | Age: 66
End: 2021-07-07
Payer: MEDICARE

## 2021-07-07 VITALS
RESPIRATION RATE: 17 BRPM | SYSTOLIC BLOOD PRESSURE: 135 MMHG | OXYGEN SATURATION: 95 % | BODY MASS INDEX: 33.64 KG/M2 | HEART RATE: 66 BPM | WEIGHT: 235 LBS | TEMPERATURE: 97.9 F | DIASTOLIC BLOOD PRESSURE: 82 MMHG | HEIGHT: 70 IN

## 2021-07-07 VITALS — SYSTOLIC BLOOD PRESSURE: 130 MMHG | DIASTOLIC BLOOD PRESSURE: 76 MMHG

## 2021-07-07 PROCEDURE — 93000 ELECTROCARDIOGRAM COMPLETE: CPT

## 2021-07-07 PROCEDURE — 99213 OFFICE O/P EST LOW 20 MIN: CPT

## 2021-07-07 PROCEDURE — 99203 OFFICE O/P NEW LOW 30 MIN: CPT

## 2021-07-07 PROCEDURE — 36415 COLL VENOUS BLD VENIPUNCTURE: CPT

## 2021-07-07 RX ORDER — POLYETHYLENE GLYCOL 3350, SODIUM SULFATE, SODIUM CHLORIDE, POTASSIUM CHLORIDE, ASCORBIC ACID, SODIUM ASCORBATE 7.5-2.691G
100 KIT ORAL
Qty: 1 | Refills: 0 | Status: DISCONTINUED | COMMUNITY
Start: 2019-11-03 | End: 2021-07-07

## 2021-07-07 NOTE — DISCUSSION/SUMMARY
[FreeTextEntry1] : Check labs incl A1c and PSA\par Advised diet, exercise and wt loss\par Same meds\par 4 mo\par Obtain med rec.\par

## 2021-07-07 NOTE — HISTORY OF PRESENT ILLNESS
[FreeTextEntry1] : MERCEDES VALERO is a 66 year year old male.\par No new complaints. Feels well.\par No recent chest pain or SOB.\par

## 2021-07-13 LAB
ALBUMIN SERPL ELPH-MCNC: 4.5 G/DL
ALP BLD-CCNC: 121 U/L
ALT SERPL-CCNC: 25 U/L
ANION GAP SERPL CALC-SCNC: 15 MMOL/L
AST SERPL-CCNC: 19 U/L
BASOPHILS # BLD AUTO: 0.05 K/UL
BASOPHILS NFR BLD AUTO: 0.7 %
BILIRUB SERPL-MCNC: 0.8 MG/DL
BUN SERPL-MCNC: 12 MG/DL
CALCIUM SERPL-MCNC: 10.1 MG/DL
CHLORIDE SERPL-SCNC: 101 MMOL/L
CHOLEST SERPL-MCNC: 129 MG/DL
CO2 SERPL-SCNC: 23 MMOL/L
CREAT SERPL-MCNC: 0.96 MG/DL
EOSINOPHIL # BLD AUTO: 0.04 K/UL
EOSINOPHIL NFR BLD AUTO: 0.6 %
ESTIMATED AVERAGE GLUCOSE: 131 MG/DL
GLUCOSE SERPL-MCNC: 129 MG/DL
HBA1C MFR BLD HPLC: 6.2 %
HCT VFR BLD CALC: 48.6 %
HDLC SERPL-MCNC: 42 MG/DL
HGB BLD-MCNC: 16.4 G/DL
IMM GRANULOCYTES NFR BLD AUTO: 0.6 %
LDLC SERPL CALC-MCNC: 70 MG/DL
LYMPHOCYTES # BLD AUTO: 1.79 K/UL
LYMPHOCYTES NFR BLD AUTO: 25.6 %
MAN DIFF?: NORMAL
MCHC RBC-ENTMCNC: 30.6 PG
MCHC RBC-ENTMCNC: 33.7 GM/DL
MCV RBC AUTO: 90.7 FL
MONOCYTES # BLD AUTO: 0.5 K/UL
MONOCYTES NFR BLD AUTO: 7.1 %
NEUTROPHILS # BLD AUTO: 4.58 K/UL
NEUTROPHILS NFR BLD AUTO: 65.4 %
NONHDLC SERPL-MCNC: 87 MG/DL
PLATELET # BLD AUTO: 202 K/UL
POTASSIUM SERPL-SCNC: 4.6 MMOL/L
PROT SERPL-MCNC: 7.7 G/DL
PSA SERPL-MCNC: 2.25 NG/ML
RBC # BLD: 5.36 M/UL
RBC # FLD: 13.2 %
SODIUM SERPL-SCNC: 139 MMOL/L
TRIGL SERPL-MCNC: 82 MG/DL
WBC # FLD AUTO: 7 K/UL

## 2021-11-15 ENCOUNTER — APPOINTMENT (OUTPATIENT)
Dept: CARDIOLOGY | Facility: CLINIC | Age: 66
End: 2021-11-15
Payer: MEDICARE

## 2021-11-15 VITALS
BODY MASS INDEX: 32.5 KG/M2 | OXYGEN SATURATION: 96 % | WEIGHT: 227 LBS | HEART RATE: 91 BPM | DIASTOLIC BLOOD PRESSURE: 78 MMHG | SYSTOLIC BLOOD PRESSURE: 130 MMHG | HEIGHT: 70 IN

## 2021-11-15 VITALS — SYSTOLIC BLOOD PRESSURE: 122 MMHG | DIASTOLIC BLOOD PRESSURE: 70 MMHG

## 2021-11-15 DIAGNOSIS — Z82.49 FAMILY HISTORY OF ISCHEMIC HEART DISEASE AND OTHER DISEASES OF THE CIRCULATORY SYSTEM: ICD-10-CM

## 2021-11-15 DIAGNOSIS — Z83.3 FAMILY HISTORY OF DIABETES MELLITUS: ICD-10-CM

## 2021-11-15 DIAGNOSIS — Z95.5 PRESENCE OF CORONARY ANGIOPLASTY IMPLANT AND GRAFT: ICD-10-CM

## 2021-11-15 PROCEDURE — 99213 OFFICE O/P EST LOW 20 MIN: CPT

## 2021-11-15 PROCEDURE — 36415 COLL VENOUS BLD VENIPUNCTURE: CPT

## 2021-11-15 NOTE — HISTORY OF PRESENT ILLNESS
[FreeTextEntry1] : MERCEDES VALERO is a 66 year year old male here for follow up.\par No new compl.\par Mild chr intermittent joint aches.\par No chest pain or SOB.\par BP good at home.\par

## 2021-11-15 NOTE — DISCUSSION/SUMMARY
[FreeTextEntry1] : Advised contin diet and exercise and wt loss.\par Contin same meds\par Check labs incl HgbA1c\par Follow up 4 mo\par Re-request medical records.\par

## 2021-11-19 LAB
ALBUMIN SERPL ELPH-MCNC: 4.6 G/DL
ALP BLD-CCNC: 123 U/L
ALT SERPL-CCNC: 26 U/L
ANION GAP SERPL CALC-SCNC: 12 MMOL/L
AST SERPL-CCNC: 17 U/L
BILIRUB SERPL-MCNC: 1.3 MG/DL
BUN SERPL-MCNC: 15 MG/DL
CALCIUM SERPL-MCNC: 9.8 MG/DL
CHLORIDE SERPL-SCNC: 102 MMOL/L
CHOLEST SERPL-MCNC: 116 MG/DL
CO2 SERPL-SCNC: 24 MMOL/L
CREAT SERPL-MCNC: 0.96 MG/DL
ESTIMATED AVERAGE GLUCOSE: 137 MG/DL
GLUCOSE SERPL-MCNC: 136 MG/DL
HBA1C MFR BLD HPLC: 6.4 %
HDLC SERPL-MCNC: 38 MG/DL
LDLC SERPL CALC-MCNC: 57 MG/DL
NONHDLC SERPL-MCNC: 77 MG/DL
POTASSIUM SERPL-SCNC: 4.6 MMOL/L
PROT SERPL-MCNC: 7.6 G/DL
SODIUM SERPL-SCNC: 137 MMOL/L
TRIGL SERPL-MCNC: 102 MG/DL

## 2022-01-14 DIAGNOSIS — N52.9 MALE ERECTILE DYSFUNCTION, UNSPECIFIED: ICD-10-CM

## 2022-02-19 ENCOUNTER — RX RENEWAL (OUTPATIENT)
Age: 67
End: 2022-02-19

## 2022-02-27 ENCOUNTER — RX RENEWAL (OUTPATIENT)
Age: 67
End: 2022-02-27

## 2022-02-27 ENCOUNTER — TRANSCRIPTION ENCOUNTER (OUTPATIENT)
Age: 67
End: 2022-02-27

## 2022-03-14 ENCOUNTER — APPOINTMENT (OUTPATIENT)
Dept: CARDIOLOGY | Facility: CLINIC | Age: 67
End: 2022-03-14
Payer: MEDICARE

## 2022-03-14 ENCOUNTER — NON-APPOINTMENT (OUTPATIENT)
Age: 67
End: 2022-03-14

## 2022-03-14 VITALS
OXYGEN SATURATION: 97 % | HEIGHT: 70 IN | WEIGHT: 229 LBS | SYSTOLIC BLOOD PRESSURE: 140 MMHG | BODY MASS INDEX: 32.78 KG/M2 | HEART RATE: 72 BPM | DIASTOLIC BLOOD PRESSURE: 80 MMHG

## 2022-03-14 VITALS — DIASTOLIC BLOOD PRESSURE: 78 MMHG | SYSTOLIC BLOOD PRESSURE: 130 MMHG

## 2022-03-14 DIAGNOSIS — R06.00 DYSPNEA, UNSPECIFIED: ICD-10-CM

## 2022-03-14 PROCEDURE — 99214 OFFICE O/P EST MOD 30 MIN: CPT

## 2022-03-14 PROCEDURE — 36415 COLL VENOUS BLD VENIPUNCTURE: CPT

## 2022-03-14 PROCEDURE — 93000 ELECTROCARDIOGRAM COMPLETE: CPT

## 2022-03-14 NOTE — DISCUSSION/SUMMARY
[FreeTextEntry1] : Sched echocardiogam to eval SALDAÑA in pt with prior ACS, murmur.\par Sched nuc stress test to eval for myocardial ischemia.\par Contin current meds\par Advised diet, exercise and wt loss\par Advised GI follow up re PPI treatment and GERD\par Check labs\par Follow up 4 mo\par

## 2022-03-14 NOTE — HISTORY OF PRESENT ILLNESS
[FreeTextEntry1] : MERCEDES VALERO is a 67 year year old male here for follow up.\par Compl of SALDAÑA recently going up stairs.  No chest pain.\par He reports he has chronic GERD that recurs if he does not take PPI regularly.  Had EGD 2 years ago with colonoscopy.\par Takes meds regularly.\par

## 2022-03-14 NOTE — PHYSICAL EXAM
[Well Developed] : well developed [Well Nourished] : well nourished [No Acute Distress] : no acute distress [Obese] : obese [Normal Conjunctiva] : normal conjunctiva [Normal Venous Pressure] : normal venous pressure [No Carotid Bruit] : no carotid bruit [Normal S1, S2] : normal S1, S2 [No Rub] : no rub [No Gallop] : no gallop [Murmur] : murmur [Clear Lung Fields] : clear lung fields [Good Air Entry] : good air entry [No Respiratory Distress] : no respiratory distress  [Soft] : abdomen soft [Non Tender] : non-tender [No Masses/organomegaly] : no masses/organomegaly [Normal Bowel Sounds] : normal bowel sounds [Normal Gait] : normal gait [No Edema] : no edema [No Cyanosis] : no cyanosis [No Clubbing] : no clubbing [No Varicosities] : no varicosities [No Rash] : no rash [No Skin Lesions] : no skin lesions [Moves all extremities] : moves all extremities [No Focal Deficits] : no focal deficits [Normal Speech] : normal speech [Alert and Oriented] : alert and oriented [Normal memory] : normal memory [de-identified] : I/VI GREG base

## 2022-03-15 ENCOUNTER — APPOINTMENT (OUTPATIENT)
Dept: CARDIOLOGY | Facility: CLINIC | Age: 67
End: 2022-03-15
Payer: MEDICARE

## 2022-03-15 ENCOUNTER — TRANSCRIPTION ENCOUNTER (OUTPATIENT)
Age: 67
End: 2022-03-15

## 2022-03-15 LAB
ALBUMIN SERPL ELPH-MCNC: 4.5 G/DL
ALP BLD-CCNC: 111 U/L
ALT SERPL-CCNC: 25 U/L
ANION GAP SERPL CALC-SCNC: 11 MMOL/L
AST SERPL-CCNC: 18 U/L
BASOPHILS # BLD AUTO: 0.06 K/UL
BASOPHILS NFR BLD AUTO: 0.9 %
BILIRUB SERPL-MCNC: 1 MG/DL
BUN SERPL-MCNC: 16 MG/DL
CALCIUM SERPL-MCNC: 9.4 MG/DL
CHLORIDE SERPL-SCNC: 104 MMOL/L
CHOLEST SERPL-MCNC: 113 MG/DL
CO2 SERPL-SCNC: 23 MMOL/L
CREAT SERPL-MCNC: 0.96 MG/DL
EGFR: 86 ML/MIN/1.73M2
EOSINOPHIL # BLD AUTO: 0.08 K/UL
EOSINOPHIL NFR BLD AUTO: 1.2 %
ESTIMATED AVERAGE GLUCOSE: 140 MG/DL
GLUCOSE SERPL-MCNC: 135 MG/DL
HBA1C MFR BLD HPLC: 6.5 %
HCT VFR BLD CALC: 49.1 %
HDLC SERPL-MCNC: 37 MG/DL
HGB BLD-MCNC: 16 G/DL
IMM GRANULOCYTES NFR BLD AUTO: 0.4 %
LDLC SERPL CALC-MCNC: 53 MG/DL
LYMPHOCYTES # BLD AUTO: 1.81 K/UL
LYMPHOCYTES NFR BLD AUTO: 26.7 %
MAN DIFF?: NORMAL
MCHC RBC-ENTMCNC: 30 PG
MCHC RBC-ENTMCNC: 32.6 GM/DL
MCV RBC AUTO: 91.9 FL
MONOCYTES # BLD AUTO: 0.5 K/UL
MONOCYTES NFR BLD AUTO: 7.4 %
NEUTROPHILS # BLD AUTO: 4.31 K/UL
NEUTROPHILS NFR BLD AUTO: 63.4 %
NONHDLC SERPL-MCNC: 76 MG/DL
PLATELET # BLD AUTO: 183 K/UL
POTASSIUM SERPL-SCNC: 4.5 MMOL/L
PROT SERPL-MCNC: 7.6 G/DL
RBC # BLD: 5.34 M/UL
RBC # FLD: 13.2 %
SODIUM SERPL-SCNC: 138 MMOL/L
TRIGL SERPL-MCNC: 111 MG/DL
WBC # FLD AUTO: 6.79 K/UL

## 2022-03-15 PROCEDURE — 93306 TTE W/DOPPLER COMPLETE: CPT

## 2022-03-24 ENCOUNTER — NON-APPOINTMENT (OUTPATIENT)
Age: 67
End: 2022-03-24

## 2022-03-25 ENCOUNTER — APPOINTMENT (OUTPATIENT)
Dept: CARDIOLOGY | Facility: CLINIC | Age: 67
End: 2022-03-25
Payer: MEDICARE

## 2022-03-25 PROCEDURE — 78452 HT MUSCLE IMAGE SPECT MULT: CPT

## 2022-03-25 PROCEDURE — A9500: CPT

## 2022-03-25 PROCEDURE — 93015 CV STRESS TEST SUPVJ I&R: CPT

## 2022-04-16 NOTE — H&P ADULT - NEGATIVE NEUROLOGICAL SYMPTOMS
Lanny
no headache/no confusion/no generalized seizures/no weakness/no paresthesias/no vertigo/no loss of sensation/no difficulty walking/no transient paralysis/no tremors/no loss of consciousness/no hemiparesis/no focal seizures/no syncope

## 2022-07-18 ENCOUNTER — APPOINTMENT (OUTPATIENT)
Dept: CARDIOLOGY | Facility: CLINIC | Age: 67
End: 2022-07-18

## 2022-07-18 VITALS
HEIGHT: 70 IN | OXYGEN SATURATION: 98 % | SYSTOLIC BLOOD PRESSURE: 160 MMHG | BODY MASS INDEX: 32.78 KG/M2 | WEIGHT: 229 LBS | HEART RATE: 66 BPM | DIASTOLIC BLOOD PRESSURE: 80 MMHG

## 2022-07-18 PROCEDURE — 36415 COLL VENOUS BLD VENIPUNCTURE: CPT

## 2022-07-18 PROCEDURE — 99214 OFFICE O/P EST MOD 30 MIN: CPT

## 2022-07-18 NOTE — DISCUSSION/SUMMARY
[FreeTextEntry1] : Contin current meds\par Advised diet, exercise and wt loss.\par Check labs incl PSA.\par Follow up 4 mo.\par

## 2022-07-18 NOTE — HISTORY OF PRESENT ILLNESS
[FreeTextEntry1] : Here for follow up.\par No new complaints.\par Not exercising but is active, working on house.\par No chest pain or SOB.\par Not checking BP at home, has had signif white coat effect previously.\par Takes meds regularly.\par Last labs reviewed with patient incl evid of borderline diabetes.\par

## 2022-07-20 ENCOUNTER — NON-APPOINTMENT (OUTPATIENT)
Age: 67
End: 2022-07-20

## 2022-07-20 LAB
ALBUMIN SERPL ELPH-MCNC: 4.5 G/DL
ALP BLD-CCNC: 98 U/L
ALT SERPL-CCNC: 24 U/L
ANION GAP SERPL CALC-SCNC: 15 MMOL/L
AST SERPL-CCNC: 18 U/L
BILIRUB SERPL-MCNC: 0.5 MG/DL
BUN SERPL-MCNC: 19 MG/DL
CALCIUM SERPL-MCNC: 9.8 MG/DL
CHLORIDE SERPL-SCNC: 105 MMOL/L
CHOLEST SERPL-MCNC: 111 MG/DL
CO2 SERPL-SCNC: 20 MMOL/L
CREAT SERPL-MCNC: 0.9 MG/DL
EGFR: 93 ML/MIN/1.73M2
ESTIMATED AVERAGE GLUCOSE: 137 MG/DL
GLUCOSE SERPL-MCNC: 126 MG/DL
HBA1C MFR BLD HPLC: 6.4 %
HDLC SERPL-MCNC: 43 MG/DL
LDLC SERPL CALC-MCNC: 49 MG/DL
NONHDLC SERPL-MCNC: 67 MG/DL
POTASSIUM SERPL-SCNC: 4.7 MMOL/L
PROT SERPL-MCNC: 7.5 G/DL
PSA SERPL-MCNC: 2.42 NG/ML
SODIUM SERPL-SCNC: 139 MMOL/L
TRIGL SERPL-MCNC: 92 MG/DL

## 2022-08-18 ENCOUNTER — RX RENEWAL (OUTPATIENT)
Age: 67
End: 2022-08-18

## 2022-08-23 ENCOUNTER — APPOINTMENT (OUTPATIENT)
Dept: PULMONOLOGY | Facility: CLINIC | Age: 67
End: 2022-08-23

## 2022-08-23 ENCOUNTER — NON-APPOINTMENT (OUTPATIENT)
Age: 67
End: 2022-08-23

## 2022-08-23 VITALS
RESPIRATION RATE: 16 BRPM | HEART RATE: 85 BPM | OXYGEN SATURATION: 94 % | DIASTOLIC BLOOD PRESSURE: 78 MMHG | TEMPERATURE: 98.1 F | SYSTOLIC BLOOD PRESSURE: 121 MMHG

## 2022-08-23 DIAGNOSIS — J20.9 ACUTE BRONCHITIS, UNSPECIFIED: ICD-10-CM

## 2022-08-23 DIAGNOSIS — J18.9 PNEUMONIA, UNSPECIFIED ORGANISM: ICD-10-CM

## 2022-08-23 PROCEDURE — 99204 OFFICE O/P NEW MOD 45 MIN: CPT | Mod: 25

## 2022-08-23 PROCEDURE — 36415 COLL VENOUS BLD VENIPUNCTURE: CPT

## 2022-08-23 PROCEDURE — 71046 X-RAY EXAM CHEST 2 VIEWS: CPT

## 2022-08-23 NOTE — HISTORY OF PRESENT ILLNESS
[Never] : never [TextBox_4] : 67M CAD s/p stents, htn, hld\par \par was in [place]\par got sick, cough, fever\par covid neg\par went to ED\par given moxi and cough syrup, told he has pna\par still finishing 10 day course of moxi\par cough is bad, wheeze\par feels some chills\par no more fever\par

## 2022-08-23 NOTE — ASSESSMENT
[FreeTextEntry1] : complete moxi\par follow up labs\par medrol pack\par fu 1 week for reassessment with  as I will be on vacation.

## 2022-08-24 LAB
ALBUMIN SERPL ELPH-MCNC: 4.4 G/DL
ALP BLD-CCNC: 128 U/L
ALT SERPL-CCNC: 35 U/L
ANION GAP SERPL CALC-SCNC: 13 MMOL/L
AST SERPL-CCNC: 23 U/L
BASOPHILS # BLD AUTO: 0.1 K/UL
BASOPHILS NFR BLD AUTO: 1.3 %
BILIRUB SERPL-MCNC: 0.8 MG/DL
BUN SERPL-MCNC: 15 MG/DL
CALCIUM SERPL-MCNC: 9.9 MG/DL
CHLORIDE SERPL-SCNC: 103 MMOL/L
CO2 SERPL-SCNC: 24 MMOL/L
CREAT SERPL-MCNC: 0.91 MG/DL
EGFR: 92 ML/MIN/1.73M2
EOSINOPHIL # BLD AUTO: 0.07 K/UL
EOSINOPHIL NFR BLD AUTO: 0.9 %
GLUCOSE SERPL-MCNC: 119 MG/DL
HCT VFR BLD CALC: 49.3 %
HGB BLD-MCNC: 16.4 G/DL
IMM GRANULOCYTES NFR BLD AUTO: 1.2 %
LYMPHOCYTES # BLD AUTO: 1.96 K/UL
LYMPHOCYTES NFR BLD AUTO: 25.7 %
MAN DIFF?: NORMAL
MCHC RBC-ENTMCNC: 30.8 PG
MCHC RBC-ENTMCNC: 33.3 GM/DL
MCV RBC AUTO: 92.5 FL
MONOCYTES # BLD AUTO: 0.68 K/UL
MONOCYTES NFR BLD AUTO: 8.9 %
NEUTROPHILS # BLD AUTO: 4.73 K/UL
NEUTROPHILS NFR BLD AUTO: 62 %
PLATELET # BLD AUTO: 277 K/UL
POTASSIUM SERPL-SCNC: 4.6 MMOL/L
PROT SERPL-MCNC: 7.6 G/DL
RBC # BLD: 5.33 M/UL
RBC # FLD: 13.4 %
SODIUM SERPL-SCNC: 140 MMOL/L
WBC # FLD AUTO: 7.63 K/UL

## 2022-08-30 ENCOUNTER — RX RENEWAL (OUTPATIENT)
Age: 67
End: 2022-08-30

## 2022-09-02 ENCOUNTER — APPOINTMENT (OUTPATIENT)
Dept: PULMONOLOGY | Facility: CLINIC | Age: 67
End: 2022-09-02

## 2022-09-02 VITALS
SYSTOLIC BLOOD PRESSURE: 147 MMHG | HEART RATE: 70 BPM | OXYGEN SATURATION: 96 % | DIASTOLIC BLOOD PRESSURE: 77 MMHG | TEMPERATURE: 98.1 F

## 2022-09-02 DIAGNOSIS — Z87.891 PERSONAL HISTORY OF NICOTINE DEPENDENCE: ICD-10-CM

## 2022-09-02 PROCEDURE — 99213 OFFICE O/P EST LOW 20 MIN: CPT

## 2022-09-02 NOTE — HISTORY OF PRESENT ILLNESS
[Never] : never [TextBox_4] : MERCEDES VALERO is a 67 year old male who presents for f/u\par \par finishe medrol dose shauna\par finished abx\par \par no chills, no wheezing\par occasional cough\par back to baseline \par  \par \par poor sleep- never had jimi testing\par

## 2022-09-02 NOTE — ASSESSMENT
[FreeTextEntry1] : back to baseline\par residual cough- suggest nebs prn he would like to hold off\par suggest the return for pfts with DR Jean\par can let us know if he wants jimi testing at that time\par

## 2022-09-14 NOTE — ED PROVIDER NOTE - NS ED ATTENDING STATEMENT MOD
# Moderate-severe AS  # Severe MR  # ADHF  # Dyspnea on exertion    Undergoing work-up for TAVR +/- MitraClip versus surgical intervention.
I have personally seen and examined this patient.  I have fully participated in the care of this patient. I have reviewed all pertinent clinical information, including history, physical exam, plan and the Resident’s note and agree except as noted.

## 2022-10-04 ENCOUNTER — APPOINTMENT (OUTPATIENT)
Dept: PULMONOLOGY | Facility: CLINIC | Age: 67
End: 2022-10-04

## 2022-11-14 ENCOUNTER — APPOINTMENT (OUTPATIENT)
Dept: CARDIOLOGY | Facility: CLINIC | Age: 67
End: 2022-11-14

## 2022-11-14 ENCOUNTER — NON-APPOINTMENT (OUTPATIENT)
Age: 67
End: 2022-11-14

## 2022-11-14 VITALS — SYSTOLIC BLOOD PRESSURE: 140 MMHG | DIASTOLIC BLOOD PRESSURE: 68 MMHG

## 2022-11-14 VITALS
HEIGHT: 70 IN | BODY MASS INDEX: 32.78 KG/M2 | DIASTOLIC BLOOD PRESSURE: 80 MMHG | WEIGHT: 229 LBS | OXYGEN SATURATION: 98 % | SYSTOLIC BLOOD PRESSURE: 150 MMHG | HEART RATE: 110 BPM

## 2022-11-14 PROCEDURE — 36415 COLL VENOUS BLD VENIPUNCTURE: CPT

## 2022-11-14 PROCEDURE — 99214 OFFICE O/P EST MOD 30 MIN: CPT

## 2022-11-14 PROCEDURE — 93000 ELECTROCARDIOGRAM COMPLETE: CPT

## 2022-11-14 RX ORDER — METHYLPREDNISOLONE 4 MG/1
4 TABLET ORAL
Qty: 1 | Refills: 0 | Status: DISCONTINUED | COMMUNITY
Start: 2022-08-23 | End: 2022-11-14

## 2022-11-14 RX ORDER — PREDNISOLONE ACETATE 10 MG/ML
1 SUSPENSION/ DROPS OPHTHALMIC
Refills: 0 | Status: ACTIVE | COMMUNITY
Start: 2022-11-14

## 2022-11-14 NOTE — HISTORY OF PRESENT ILLNESS
[FreeTextEntry1] : Here for follow up.  Over the late summer was treated for a prolonged respiratory illness in [place], diagnosed with pneumonia, bronchitis, and some type of ophthalmologic sequela.  Was treated with 2 courses of oral antibiotic, oral steroid, cough med, and eye drops.  He has followed up with pulmonary.\par He feels much better, has only mild residual cough.\par BP generally good at home.\par

## 2022-11-14 NOTE — DISCUSSION/SUMMARY
[FreeTextEntry1] : Advised contin diet, exercise and wt loss.\par Same meds.\par Check labs for lipids and HgbA1c.\par Home BP monitoring, advised to call if SBP persistently or repeatedly > 140.\par Follow up 4 mo\par

## 2022-11-17 ENCOUNTER — NON-APPOINTMENT (OUTPATIENT)
Age: 67
End: 2022-11-17

## 2022-11-17 LAB
ALBUMIN SERPL ELPH-MCNC: 4.5 G/DL
ALP BLD-CCNC: 106 U/L
ALT SERPL-CCNC: 22 U/L
AST SERPL-CCNC: 18 U/L
BILIRUB DIRECT SERPL-MCNC: 0.2 MG/DL
BILIRUB INDIRECT SERPL-MCNC: 0.5 MG/DL
BILIRUB SERPL-MCNC: 0.8 MG/DL
CHOLEST SERPL-MCNC: 112 MG/DL
ESTIMATED AVERAGE GLUCOSE: 140 MG/DL
HBA1C MFR BLD HPLC: 6.5 %
HDLC SERPL-MCNC: 41 MG/DL
LDLC SERPL CALC-MCNC: 54 MG/DL
NONHDLC SERPL-MCNC: 72 MG/DL
PROT SERPL-MCNC: 7.7 G/DL
TRIGL SERPL-MCNC: 90 MG/DL

## 2022-11-28 ENCOUNTER — RX RENEWAL (OUTPATIENT)
Age: 67
End: 2022-11-28

## 2023-03-13 ENCOUNTER — APPOINTMENT (OUTPATIENT)
Dept: CARDIOLOGY | Facility: CLINIC | Age: 68
End: 2023-03-13
Payer: MEDICARE

## 2023-03-13 VITALS
OXYGEN SATURATION: 98 % | BODY MASS INDEX: 32.35 KG/M2 | HEIGHT: 70 IN | WEIGHT: 226 LBS | HEART RATE: 77 BPM | DIASTOLIC BLOOD PRESSURE: 80 MMHG | SYSTOLIC BLOOD PRESSURE: 130 MMHG

## 2023-03-13 VITALS — SYSTOLIC BLOOD PRESSURE: 140 MMHG | DIASTOLIC BLOOD PRESSURE: 74 MMHG

## 2023-03-13 PROCEDURE — 99214 OFFICE O/P EST MOD 30 MIN: CPT

## 2023-03-13 PROCEDURE — 36415 COLL VENOUS BLD VENIPUNCTURE: CPT

## 2023-03-13 NOTE — HISTORY OF PRESENT ILLNESS
[FreeTextEntry1] : Here for follow up.\par No new complaints.\par Had COVID with mild symptoms and resolution.\par Not exercising. BP at home sporadically has been around 130/high 70s.\par No chest pain or SOB.\par Diet is fair but not consistent.\par Requests refill on Omeprazole. States he is dependent on it daily for control of GERD symptoms.\par He had negative EGD and colonoscopy 3 years ago.\par

## 2023-03-13 NOTE — DISCUSSION/SUMMARY
[FreeTextEntry1] : Contin same meds.\par Advised diet, exercise and wt loss.\par Check labs\par Discussed dietary and lifestyle management of GERD.\par Refill PPI, but advised follow up with GI.\par 4 mo\par

## 2023-03-14 ENCOUNTER — TRANSCRIPTION ENCOUNTER (OUTPATIENT)
Age: 68
End: 2023-03-14

## 2023-03-14 LAB
ALBUMIN SERPL ELPH-MCNC: 4.5 G/DL
ALP BLD-CCNC: 104 U/L
ALT SERPL-CCNC: 27 U/L
ANION GAP SERPL CALC-SCNC: 13 MMOL/L
AST SERPL-CCNC: 20 U/L
BILIRUB SERPL-MCNC: 0.6 MG/DL
BUN SERPL-MCNC: 15 MG/DL
CALCIUM SERPL-MCNC: 9.9 MG/DL
CHLORIDE SERPL-SCNC: 104 MMOL/L
CHOLEST SERPL-MCNC: 117 MG/DL
CO2 SERPL-SCNC: 22 MMOL/L
CREAT SERPL-MCNC: 0.86 MG/DL
EGFR: 94 ML/MIN/1.73M2
ESTIMATED AVERAGE GLUCOSE: 140 MG/DL
GLUCOSE SERPL-MCNC: 149 MG/DL
HBA1C MFR BLD HPLC: 6.5 %
HDLC SERPL-MCNC: 42 MG/DL
LDLC SERPL CALC-MCNC: 57 MG/DL
NONHDLC SERPL-MCNC: 74 MG/DL
POTASSIUM SERPL-SCNC: 4.4 MMOL/L
PROT SERPL-MCNC: 7.3 G/DL
SODIUM SERPL-SCNC: 140 MMOL/L
TRIGL SERPL-MCNC: 87 MG/DL

## 2023-04-17 ENCOUNTER — APPOINTMENT (OUTPATIENT)
Dept: CARDIOLOGY | Facility: CLINIC | Age: 68
End: 2023-04-17
Payer: MEDICARE

## 2023-04-17 ENCOUNTER — NON-APPOINTMENT (OUTPATIENT)
Age: 68
End: 2023-04-17

## 2023-04-17 VITALS
HEART RATE: 91 BPM | DIASTOLIC BLOOD PRESSURE: 60 MMHG | TEMPERATURE: 97.8 F | BODY MASS INDEX: 32.07 KG/M2 | OXYGEN SATURATION: 97 % | SYSTOLIC BLOOD PRESSURE: 110 MMHG | HEIGHT: 70 IN | WEIGHT: 224 LBS

## 2023-04-17 PROCEDURE — 93306 TTE W/DOPPLER COMPLETE: CPT

## 2023-04-17 PROCEDURE — ZZZZZ: CPT

## 2023-04-17 PROCEDURE — 99214 OFFICE O/P EST MOD 30 MIN: CPT

## 2023-04-17 PROCEDURE — 93000 ELECTROCARDIOGRAM COMPLETE: CPT | Mod: 59

## 2023-04-17 PROCEDURE — 93242 EXT ECG>48HR<7D RECORDING: CPT

## 2023-04-17 NOTE — HISTORY OF PRESENT ILLNESS
[FreeTextEntry1] : Patient complains of episode of left upper chest pain that occurred 4 d ago, began acutely while lifting heavy object.  States the pain resolved on its own and did not have any assoc symptoms at that time.\par He rested over the next few days, and has felt more tired, and some sweating at night.\par No SOB. + palpitations. \par He came in for ECG to make sure the pain was not from his heart.\par 
normal S1, S2 heard

## 2023-04-17 NOTE — DISCUSSION/SUMMARY
[FreeTextEntry1] : Discussed natural history and management of AF.\par Rx Xarelto for thromboembolic stroke prophylaxis.\par Rx Metoprolol 25 mg bid for rate control. Contin other current meds incl aspirin for now, but long term will consider discontinuation as PCI was remote if stable.\par Echo\par 3-day cardiac monitor.\par Follow up 1 week. Consider sleep study and EP eval.\par \par

## 2023-04-17 NOTE — PHYSICAL EXAM

## 2023-04-24 ENCOUNTER — NON-APPOINTMENT (OUTPATIENT)
Age: 68
End: 2023-04-24

## 2023-04-24 ENCOUNTER — APPOINTMENT (OUTPATIENT)
Dept: CARDIOLOGY | Facility: CLINIC | Age: 68
End: 2023-04-24
Payer: MEDICARE

## 2023-04-24 VITALS
WEIGHT: 227 LBS | SYSTOLIC BLOOD PRESSURE: 122 MMHG | OXYGEN SATURATION: 98 % | BODY MASS INDEX: 32.5 KG/M2 | HEART RATE: 89 BPM | HEIGHT: 70 IN | DIASTOLIC BLOOD PRESSURE: 70 MMHG

## 2023-04-24 DIAGNOSIS — R29.818 OTHER SYMPTOMS AND SIGNS INVOLVING THE NERVOUS SYSTEM: ICD-10-CM

## 2023-04-24 PROCEDURE — 93000 ELECTROCARDIOGRAM COMPLETE: CPT

## 2023-04-24 PROCEDURE — 99213 OFFICE O/P EST LOW 20 MIN: CPT

## 2023-04-24 NOTE — DISCUSSION/SUMMARY
[FreeTextEntry1] : Advised sched sleep study to eval for ZEINA\par Contin beta blocker and Xarelto.\par Stop aspirin as PCI was remote > 4 years ago.\par Advised diet and wt loss.\par Check monitor.\par Follow up 2 mo\par

## 2023-04-24 NOTE — HISTORY OF PRESENT ILLNESS
[FreeTextEntry1] : Here for follow up of PAF. Feels well. BP has been good at home.\par No palps or dizziness. No bleeding.\par

## 2023-05-01 ENCOUNTER — NON-APPOINTMENT (OUTPATIENT)
Age: 68
End: 2023-05-01

## 2023-05-01 DIAGNOSIS — I47.29 OTHER VENTRICULAR TACHYCARDIA: ICD-10-CM

## 2023-05-01 PROCEDURE — 93244 EXT ECG>48HR<7D REV&INTERPJ: CPT

## 2023-05-11 ENCOUNTER — APPOINTMENT (OUTPATIENT)
Dept: ELECTROPHYSIOLOGY | Facility: CLINIC | Age: 68
End: 2023-05-11

## 2023-05-11 ENCOUNTER — APPOINTMENT (OUTPATIENT)
Dept: CARDIOLOGY | Facility: CLINIC | Age: 68
End: 2023-05-11

## 2023-05-24 ENCOUNTER — APPOINTMENT (OUTPATIENT)
Dept: CARDIOLOGY | Facility: CLINIC | Age: 68
End: 2023-05-24
Payer: MEDICARE

## 2023-05-24 PROCEDURE — 93015 CV STRESS TEST SUPVJ I&R: CPT

## 2023-05-24 PROCEDURE — 78452 HT MUSCLE IMAGE SPECT MULT: CPT

## 2023-05-24 PROCEDURE — A9500: CPT

## 2023-06-16 ENCOUNTER — RX RENEWAL (OUTPATIENT)
Age: 68
End: 2023-06-16

## 2023-06-16 RX ORDER — EZETIMIBE 10 MG/1
10 TABLET ORAL DAILY
Qty: 90 | Refills: 3 | Status: ACTIVE | COMMUNITY
Start: 2021-03-31 | End: 1900-01-01

## 2023-06-19 ENCOUNTER — APPOINTMENT (OUTPATIENT)
Dept: CARDIOLOGY | Facility: CLINIC | Age: 68
End: 2023-06-19
Payer: MEDICARE

## 2023-06-19 VITALS
BODY MASS INDEX: 32.64 KG/M2 | DIASTOLIC BLOOD PRESSURE: 78 MMHG | HEART RATE: 59 BPM | SYSTOLIC BLOOD PRESSURE: 140 MMHG | WEIGHT: 228 LBS | HEIGHT: 70 IN | OXYGEN SATURATION: 98 %

## 2023-06-19 PROCEDURE — 99213 OFFICE O/P EST LOW 20 MIN: CPT

## 2023-06-19 RX ORDER — SILDENAFIL 100 MG/1
100 TABLET, FILM COATED ORAL
Qty: 90 | Refills: 0 | Status: ACTIVE | COMMUNITY
Start: 2022-01-14 | End: 1900-01-01

## 2023-06-19 NOTE — DISCUSSION/SUMMARY
[FreeTextEntry1] : Contin current meds.\par Strongly advised diet and wt loss for borderline diabetes.\par Follow up 3 mo\par

## 2023-06-19 NOTE — HISTORY OF PRESENT ILLNESS
[FreeTextEntry1] : Here for follow up.\par Feels well. No complaints.\par Diet not so good recently.\par Nuc stress report reviewed.\par No chest pain, SOB, palps.

## 2023-07-10 ENCOUNTER — RX RENEWAL (OUTPATIENT)
Age: 68
End: 2023-07-10

## 2023-09-18 ENCOUNTER — APPOINTMENT (OUTPATIENT)
Dept: CARDIOLOGY | Facility: CLINIC | Age: 68
End: 2023-09-18
Payer: MEDICARE

## 2023-09-18 VITALS — SYSTOLIC BLOOD PRESSURE: 136 MMHG | DIASTOLIC BLOOD PRESSURE: 72 MMHG

## 2023-09-18 VITALS
HEART RATE: 71 BPM | BODY MASS INDEX: 32.35 KG/M2 | HEIGHT: 70 IN | DIASTOLIC BLOOD PRESSURE: 70 MMHG | SYSTOLIC BLOOD PRESSURE: 138 MMHG | WEIGHT: 226 LBS | OXYGEN SATURATION: 98 %

## 2023-09-18 DIAGNOSIS — I10 ESSENTIAL (PRIMARY) HYPERTENSION: ICD-10-CM

## 2023-09-18 PROCEDURE — 99214 OFFICE O/P EST MOD 30 MIN: CPT

## 2023-09-18 PROCEDURE — 36415 COLL VENOUS BLD VENIPUNCTURE: CPT

## 2023-09-21 LAB
ALBUMIN SERPL ELPH-MCNC: 4.6 G/DL
ALP BLD-CCNC: 119 U/L
ALT SERPL-CCNC: 23 U/L
ANION GAP SERPL CALC-SCNC: 17 MMOL/L
AST SERPL-CCNC: 22 U/L
BILIRUB SERPL-MCNC: 0.9 MG/DL
BUN SERPL-MCNC: 13 MG/DL
CALCIUM SERPL-MCNC: 9.8 MG/DL
CHLORIDE SERPL-SCNC: 102 MMOL/L
CHOLEST SERPL-MCNC: 118 MG/DL
CO2 SERPL-SCNC: 19 MMOL/L
CREAT SERPL-MCNC: 0.93 MG/DL
EGFR: 89 ML/MIN/1.73M2
ESTIMATED AVERAGE GLUCOSE: 148 MG/DL
GLUCOSE SERPL-MCNC: 129 MG/DL
HBA1C MFR BLD HPLC: 6.8 %
HCT VFR BLD CALC: 48.6 %
HDLC SERPL-MCNC: 38 MG/DL
HGB BLD-MCNC: 16.1 G/DL
LDLC SERPL CALC-MCNC: 62 MG/DL
MCHC RBC-ENTMCNC: 31.1 PG
MCHC RBC-ENTMCNC: 33.1 GM/DL
MCV RBC AUTO: 93.8 FL
NONHDLC SERPL-MCNC: 81 MG/DL
PLATELET # BLD AUTO: 199 K/UL
POTASSIUM SERPL-SCNC: 4.7 MMOL/L
PROT SERPL-MCNC: 7.7 G/DL
PSA SERPL-MCNC: 1.88 NG/ML
RBC # BLD: 5.18 M/UL
RBC # FLD: 13.9 %
SODIUM SERPL-SCNC: 138 MMOL/L
TRIGL SERPL-MCNC: 98 MG/DL
WBC # FLD AUTO: 7.41 K/UL

## 2023-12-06 ENCOUNTER — RX RENEWAL (OUTPATIENT)
Age: 68
End: 2023-12-06

## 2023-12-18 ENCOUNTER — APPOINTMENT (OUTPATIENT)
Dept: CARDIOLOGY | Facility: CLINIC | Age: 68
End: 2023-12-18
Payer: MEDICARE

## 2023-12-18 ENCOUNTER — NON-APPOINTMENT (OUTPATIENT)
Age: 68
End: 2023-12-18

## 2023-12-18 VITALS
OXYGEN SATURATION: 98 % | SYSTOLIC BLOOD PRESSURE: 140 MMHG | HEIGHT: 70 IN | WEIGHT: 226 LBS | DIASTOLIC BLOOD PRESSURE: 70 MMHG | HEART RATE: 78 BPM | BODY MASS INDEX: 32.35 KG/M2

## 2023-12-18 DIAGNOSIS — E78.00 PURE HYPERCHOLESTEROLEMIA, UNSPECIFIED: ICD-10-CM

## 2023-12-18 DIAGNOSIS — I10 ESSENTIAL (PRIMARY) HYPERTENSION: ICD-10-CM

## 2023-12-18 DIAGNOSIS — R73.03 PREDIABETES.: ICD-10-CM

## 2023-12-18 DIAGNOSIS — E66.9 OBESITY, UNSPECIFIED: ICD-10-CM

## 2023-12-18 PROCEDURE — 36415 COLL VENOUS BLD VENIPUNCTURE: CPT

## 2023-12-18 PROCEDURE — 93000 ELECTROCARDIOGRAM COMPLETE: CPT

## 2023-12-18 PROCEDURE — 99214 OFFICE O/P EST MOD 30 MIN: CPT

## 2023-12-18 NOTE — HISTORY OF PRESENT ILLNESS
[FreeTextEntry1] : Here for follow up. No new complaints. Reports rare DBP < 60, no symptoms.  BP predominantly in 120s-140s/70s. No CP, SOB, dizziness. He reports his diet was good until recently due to holiday season. Last labs reviewed with patient incl higher HgbA1c.

## 2023-12-18 NOTE — DISCUSSION/SUMMARY
[EKG obtained to assist in diagnosis and management of assessed problem(s)] : EKG obtained to assist in diagnosis and management of assessed problem(s) [FreeTextEntry1] : Has follow up with GI scheduled. Recheck labs incl HgbA1c Advised contin efforts for diet, exercise and wt loss. Same meds. Follow up 4 mo.

## 2023-12-19 ENCOUNTER — TRANSCRIPTION ENCOUNTER (OUTPATIENT)
Age: 68
End: 2023-12-19

## 2023-12-19 LAB
ALBUMIN SERPL ELPH-MCNC: 4.4 G/DL
ALP BLD-CCNC: 95 U/L
ALT SERPL-CCNC: 20 U/L
ANION GAP SERPL CALC-SCNC: 12 MMOL/L
AST SERPL-CCNC: 22 U/L
BILIRUB SERPL-MCNC: 0.6 MG/DL
BUN SERPL-MCNC: 18 MG/DL
CALCIUM SERPL-MCNC: 9.6 MG/DL
CHLORIDE SERPL-SCNC: 103 MMOL/L
CHOLEST SERPL-MCNC: 107 MG/DL
CO2 SERPL-SCNC: 24 MMOL/L
CREAT SERPL-MCNC: 0.97 MG/DL
EGFR: 85 ML/MIN/1.73M2
ESTIMATED AVERAGE GLUCOSE: 131 MG/DL
GLUCOSE SERPL-MCNC: 132 MG/DL
HBA1C MFR BLD HPLC: 6.2 %
HDLC SERPL-MCNC: 40 MG/DL
LDLC SERPL CALC-MCNC: 49 MG/DL
NONHDLC SERPL-MCNC: 67 MG/DL
POTASSIUM SERPL-SCNC: 4.5 MMOL/L
PROT SERPL-MCNC: 7.4 G/DL
SODIUM SERPL-SCNC: 139 MMOL/L
TRIGL SERPL-MCNC: 91 MG/DL

## 2024-01-05 ENCOUNTER — RX RENEWAL (OUTPATIENT)
Age: 69
End: 2024-01-05

## 2024-01-24 ENCOUNTER — RX RENEWAL (OUTPATIENT)
Age: 69
End: 2024-01-24

## 2024-01-24 RX ORDER — ATORVASTATIN CALCIUM 20 MG/1
20 TABLET, FILM COATED ORAL
Qty: 90 | Refills: 3 | Status: ACTIVE | COMMUNITY
Start: 2021-06-29 | End: 1900-01-01

## 2024-03-05 ENCOUNTER — APPOINTMENT (OUTPATIENT)
Dept: CARDIOLOGY | Facility: CLINIC | Age: 69
End: 2024-03-05
Payer: MEDICARE

## 2024-03-05 VITALS
BODY MASS INDEX: 32.64 KG/M2 | WEIGHT: 228 LBS | OXYGEN SATURATION: 97 % | HEIGHT: 70 IN | SYSTOLIC BLOOD PRESSURE: 148 MMHG | HEART RATE: 67 BPM | DIASTOLIC BLOOD PRESSURE: 78 MMHG

## 2024-03-05 VITALS — DIASTOLIC BLOOD PRESSURE: 70 MMHG | SYSTOLIC BLOOD PRESSURE: 138 MMHG

## 2024-03-05 PROCEDURE — 99213 OFFICE O/P EST LOW 20 MIN: CPT

## 2024-03-05 NOTE — DISCUSSION/SUMMARY
[FreeTextEntry1] : Inc Losartan to 50 mg QHS. Contin other current meds. Encouraged low sodium diet, exercise and wt loss. Follow up as scheduled in mid April.

## 2024-03-05 NOTE — PHYSICAL EXAM
[Well Developed] : well developed [No Acute Distress] : no acute distress [Obese] : obese [Well Nourished] : well nourished [Normal Conjunctiva] : normal conjunctiva [Normal Venous Pressure] : normal venous pressure [No Carotid Bruit] : no carotid bruit [Normal S1, S2] : normal S1, S2 [No Rub] : no rub [Clear Lung Fields] : clear lung fields [No Gallop] : no gallop [Good Air Entry] : good air entry [Soft] : abdomen soft [No Respiratory Distress] : no respiratory distress  [Non Tender] : non-tender [No Masses/organomegaly] : no masses/organomegaly [Normal Bowel Sounds] : normal bowel sounds [Normal Gait] : normal gait [No Edema] : no edema [No Cyanosis] : no cyanosis [No Clubbing] : no clubbing [No Varicosities] : no varicosities [No Rash] : no rash [No Skin Lesions] : no skin lesions [No Focal Deficits] : no focal deficits [Moves all extremities] : moves all extremities [Normal Speech] : normal speech [Alert and Oriented] : alert and oriented [Normal memory] : normal memory [Murmur] : murmur [de-identified] : II/VI GREG base

## 2024-03-05 NOTE — HISTORY OF PRESENT ILLNESS
[FreeTextEntry1] : Compl of inc BP in mornings to 150s systolic.  Generally goes down later in day to high 120s-130s. Has more stress and prob more salt intake. No change in chronic atypical muscular type left chest discomfort or chronic stable SOB. Occas orthostatic dizziness.

## 2024-03-18 RX ORDER — LOSARTAN POTASSIUM 50 MG/1
50 TABLET, FILM COATED ORAL DAILY
Qty: 90 | Refills: 3 | Status: ACTIVE | COMMUNITY
Start: 2021-06-29 | End: 1900-01-01

## 2024-04-04 ENCOUNTER — RX RENEWAL (OUTPATIENT)
Age: 69
End: 2024-04-04

## 2024-04-04 RX ORDER — METOPROLOL TARTRATE 25 MG/1
25 TABLET, FILM COATED ORAL TWICE DAILY
Qty: 180 | Refills: 2 | Status: ACTIVE | COMMUNITY
Start: 2023-04-17 | End: 1900-01-01

## 2024-04-10 ENCOUNTER — OUTPATIENT (OUTPATIENT)
Dept: OUTPATIENT SERVICES | Facility: HOSPITAL | Age: 69
LOS: 1 days | End: 2024-04-10
Payer: MEDICARE

## 2024-04-10 ENCOUNTER — APPOINTMENT (OUTPATIENT)
Dept: NUCLEAR MEDICINE | Facility: IMAGING CENTER | Age: 69
End: 2024-04-10
Payer: MEDICARE

## 2024-04-10 DIAGNOSIS — Z98.89 OTHER SPECIFIED POSTPROCEDURAL STATES: Chronic | ICD-10-CM

## 2024-04-10 DIAGNOSIS — Z00.8 ENCOUNTER FOR OTHER GENERAL EXAMINATION: ICD-10-CM

## 2024-04-10 PROCEDURE — 78803 RP LOCLZJ TUM SPECT 1 AREA: CPT | Mod: MH

## 2024-04-10 PROCEDURE — A9584: CPT

## 2024-04-10 PROCEDURE — 78803 RP LOCLZJ TUM SPECT 1 AREA: CPT | Mod: 26,MH

## 2024-04-16 ENCOUNTER — INPATIENT (INPATIENT)
Facility: HOSPITAL | Age: 69
LOS: 0 days | Discharge: HOME | End: 2024-04-17
Attending: HOSPITALIST | Admitting: HOSPITALIST
Payer: MEDICARE

## 2024-04-16 ENCOUNTER — NON-APPOINTMENT (OUTPATIENT)
Age: 69
End: 2024-04-16

## 2024-04-16 VITALS
HEART RATE: 83 BPM | TEMPERATURE: 97 F | DIASTOLIC BLOOD PRESSURE: 75 MMHG | RESPIRATION RATE: 17 BRPM | SYSTOLIC BLOOD PRESSURE: 166 MMHG | OXYGEN SATURATION: 95 %

## 2024-04-16 DIAGNOSIS — Z98.89 OTHER SPECIFIED POSTPROCEDURAL STATES: Chronic | ICD-10-CM

## 2024-04-16 DIAGNOSIS — I24.9 ACUTE ISCHEMIC HEART DISEASE, UNSPECIFIED: ICD-10-CM

## 2024-04-16 LAB
ALBUMIN SERPL ELPH-MCNC: 4.2 G/DL — SIGNIFICANT CHANGE UP (ref 3.3–5)
ALP SERPL-CCNC: 91 U/L — SIGNIFICANT CHANGE UP (ref 40–120)
ALT FLD-CCNC: 26 U/L — SIGNIFICANT CHANGE UP (ref 4–41)
ANION GAP SERPL CALC-SCNC: 9 MMOL/L — SIGNIFICANT CHANGE UP (ref 7–14)
APTT BLD: 34.5 SEC — SIGNIFICANT CHANGE UP (ref 24.5–35.6)
AST SERPL-CCNC: 16 U/L — SIGNIFICANT CHANGE UP (ref 4–40)
BASOPHILS # BLD AUTO: 0.07 K/UL — SIGNIFICANT CHANGE UP (ref 0–0.2)
BASOPHILS NFR BLD AUTO: 1 % — SIGNIFICANT CHANGE UP (ref 0–2)
BILIRUB SERPL-MCNC: 0.7 MG/DL — SIGNIFICANT CHANGE UP (ref 0.2–1.2)
BUN SERPL-MCNC: 16 MG/DL — SIGNIFICANT CHANGE UP (ref 7–23)
CALCIUM SERPL-MCNC: 9.2 MG/DL — SIGNIFICANT CHANGE UP (ref 8.4–10.5)
CHLORIDE SERPL-SCNC: 105 MMOL/L — SIGNIFICANT CHANGE UP (ref 98–107)
CO2 SERPL-SCNC: 24 MMOL/L — SIGNIFICANT CHANGE UP (ref 22–31)
CREAT SERPL-MCNC: 0.91 MG/DL — SIGNIFICANT CHANGE UP (ref 0.5–1.3)
EGFR: 91 ML/MIN/1.73M2 — SIGNIFICANT CHANGE UP
EOSINOPHIL # BLD AUTO: 0.04 K/UL — SIGNIFICANT CHANGE UP (ref 0–0.5)
EOSINOPHIL NFR BLD AUTO: 0.5 % — SIGNIFICANT CHANGE UP (ref 0–6)
GLUCOSE SERPL-MCNC: 133 MG/DL — HIGH (ref 70–99)
HCT VFR BLD CALC: 43.2 % — SIGNIFICANT CHANGE UP (ref 39–50)
HGB BLD-MCNC: 15.1 G/DL — SIGNIFICANT CHANGE UP (ref 13–17)
IANC: 4.58 K/UL — SIGNIFICANT CHANGE UP (ref 1.8–7.4)
IMM GRANULOCYTES NFR BLD AUTO: 0.7 % — SIGNIFICANT CHANGE UP (ref 0–0.9)
INR BLD: 1.16 RATIO — SIGNIFICANT CHANGE UP (ref 0.85–1.18)
LYMPHOCYTES # BLD AUTO: 2.01 K/UL — SIGNIFICANT CHANGE UP (ref 1–3.3)
LYMPHOCYTES # BLD AUTO: 27.3 % — SIGNIFICANT CHANGE UP (ref 13–44)
MCHC RBC-ENTMCNC: 30.8 PG — SIGNIFICANT CHANGE UP (ref 27–34)
MCHC RBC-ENTMCNC: 35 GM/DL — SIGNIFICANT CHANGE UP (ref 32–36)
MCV RBC AUTO: 88.2 FL — SIGNIFICANT CHANGE UP (ref 80–100)
MONOCYTES # BLD AUTO: 0.61 K/UL — SIGNIFICANT CHANGE UP (ref 0–0.9)
MONOCYTES NFR BLD AUTO: 8.3 % — SIGNIFICANT CHANGE UP (ref 2–14)
NEUTROPHILS # BLD AUTO: 4.58 K/UL — SIGNIFICANT CHANGE UP (ref 1.8–7.4)
NEUTROPHILS NFR BLD AUTO: 62.2 % — SIGNIFICANT CHANGE UP (ref 43–77)
NRBC # BLD: 0 /100 WBCS — SIGNIFICANT CHANGE UP (ref 0–0)
NRBC # FLD: 0 K/UL — SIGNIFICANT CHANGE UP (ref 0–0)
NT-PROBNP SERPL-SCNC: 60 PG/ML — SIGNIFICANT CHANGE UP
PLATELET # BLD AUTO: 188 K/UL — SIGNIFICANT CHANGE UP (ref 150–400)
POTASSIUM SERPL-MCNC: 4.3 MMOL/L — SIGNIFICANT CHANGE UP (ref 3.5–5.3)
POTASSIUM SERPL-SCNC: 4.3 MMOL/L — SIGNIFICANT CHANGE UP (ref 3.5–5.3)
PROT SERPL-MCNC: 7.3 G/DL — SIGNIFICANT CHANGE UP (ref 6–8.3)
PROTHROM AB SERPL-ACNC: 13 SEC — SIGNIFICANT CHANGE UP (ref 9.5–13)
RBC # BLD: 4.9 M/UL — SIGNIFICANT CHANGE UP (ref 4.2–5.8)
RBC # FLD: 12.7 % — SIGNIFICANT CHANGE UP (ref 10.3–14.5)
SODIUM SERPL-SCNC: 138 MMOL/L — SIGNIFICANT CHANGE UP (ref 135–145)
TROPONIN T, HIGH SENSITIVITY RESULT: 7 NG/L — SIGNIFICANT CHANGE UP
TROPONIN T, HIGH SENSITIVITY RESULT: 8 NG/L — SIGNIFICANT CHANGE UP
WBC # BLD: 7.36 K/UL — SIGNIFICANT CHANGE UP (ref 3.8–10.5)
WBC # FLD AUTO: 7.36 K/UL — SIGNIFICANT CHANGE UP (ref 3.8–10.5)

## 2024-04-16 PROCEDURE — 99285 EMERGENCY DEPT VISIT HI MDM: CPT

## 2024-04-16 PROCEDURE — 71045 X-RAY EXAM CHEST 1 VIEW: CPT | Mod: 26

## 2024-04-16 PROCEDURE — 99223 1ST HOSP IP/OBS HIGH 75: CPT

## 2024-04-16 RX ORDER — DEXTROSE 50 % IN WATER 50 %
15 SYRINGE (ML) INTRAVENOUS ONCE
Refills: 0 | Status: DISCONTINUED | OUTPATIENT
Start: 2024-04-16 | End: 2024-04-17

## 2024-04-16 RX ORDER — LOSARTAN POTASSIUM 100 MG/1
1 TABLET, FILM COATED ORAL
Refills: 0 | DISCHARGE

## 2024-04-16 RX ORDER — LOSARTAN POTASSIUM 100 MG/1
1 TABLET, FILM COATED ORAL
Qty: 0 | Refills: 0 | DISCHARGE

## 2024-04-16 RX ORDER — ESOMEPRAZOLE MAGNESIUM 40 MG/1
1 CAPSULE, DELAYED RELEASE ORAL
Qty: 0 | Refills: 0 | DISCHARGE

## 2024-04-16 RX ORDER — SODIUM CHLORIDE 9 MG/ML
1000 INJECTION, SOLUTION INTRAVENOUS
Refills: 0 | Status: DISCONTINUED | OUTPATIENT
Start: 2024-04-16 | End: 2024-04-17

## 2024-04-16 RX ORDER — DEXTROSE 50 % IN WATER 50 %
25 SYRINGE (ML) INTRAVENOUS ONCE
Refills: 0 | Status: DISCONTINUED | OUTPATIENT
Start: 2024-04-16 | End: 2024-04-17

## 2024-04-16 RX ORDER — GLUCAGON INJECTION, SOLUTION 0.5 MG/.1ML
1 INJECTION, SOLUTION SUBCUTANEOUS ONCE
Refills: 0 | Status: DISCONTINUED | OUTPATIENT
Start: 2024-04-16 | End: 2024-04-17

## 2024-04-16 RX ORDER — ACETAMINOPHEN 500 MG
650 TABLET ORAL EVERY 6 HOURS
Refills: 0 | Status: DISCONTINUED | OUTPATIENT
Start: 2024-04-16 | End: 2024-04-17

## 2024-04-16 RX ORDER — LOSARTAN POTASSIUM 100 MG/1
50 TABLET, FILM COATED ORAL DAILY
Refills: 0 | Status: DISCONTINUED | OUTPATIENT
Start: 2024-04-16 | End: 2024-04-17

## 2024-04-16 RX ORDER — LANOLIN ALCOHOL/MO/W.PET/CERES
3 CREAM (GRAM) TOPICAL AT BEDTIME
Refills: 0 | Status: DISCONTINUED | OUTPATIENT
Start: 2024-04-16 | End: 2024-04-17

## 2024-04-16 RX ORDER — DEXTROSE 50 % IN WATER 50 %
12.5 SYRINGE (ML) INTRAVENOUS ONCE
Refills: 0 | Status: DISCONTINUED | OUTPATIENT
Start: 2024-04-16 | End: 2024-04-17

## 2024-04-16 RX ORDER — PANTOPRAZOLE SODIUM 20 MG/1
40 TABLET, DELAYED RELEASE ORAL
Refills: 0 | Status: DISCONTINUED | OUTPATIENT
Start: 2024-04-16 | End: 2024-04-17

## 2024-04-16 RX ORDER — RIVAROXABAN 15 MG-20MG
1 KIT ORAL
Refills: 0 | DISCHARGE

## 2024-04-16 RX ORDER — INSULIN LISPRO 100/ML
VIAL (ML) SUBCUTANEOUS
Refills: 0 | Status: DISCONTINUED | OUTPATIENT
Start: 2024-04-16 | End: 2024-04-17

## 2024-04-16 RX ORDER — ATORVASTATIN CALCIUM 80 MG/1
20 TABLET, FILM COATED ORAL AT BEDTIME
Refills: 0 | Status: DISCONTINUED | OUTPATIENT
Start: 2024-04-16 | End: 2024-04-17

## 2024-04-16 RX ORDER — INSULIN LISPRO 100/ML
VIAL (ML) SUBCUTANEOUS AT BEDTIME
Refills: 0 | Status: DISCONTINUED | OUTPATIENT
Start: 2024-04-16 | End: 2024-04-17

## 2024-04-16 RX ORDER — ONDANSETRON 8 MG/1
4 TABLET, FILM COATED ORAL EVERY 8 HOURS
Refills: 0 | Status: DISCONTINUED | OUTPATIENT
Start: 2024-04-16 | End: 2024-04-17

## 2024-04-16 RX ORDER — RIVAROXABAN 15 MG-20MG
20 KIT ORAL
Refills: 0 | Status: DISCONTINUED | OUTPATIENT
Start: 2024-04-16 | End: 2024-04-17

## 2024-04-16 RX ORDER — METFORMIN HYDROCHLORIDE 850 MG/1
1 TABLET ORAL
Refills: 0 | DISCHARGE

## 2024-04-16 RX ORDER — EZETIMIBE 10 MG/1
1 TABLET ORAL
Qty: 0 | Refills: 0 | DISCHARGE

## 2024-04-16 RX ORDER — METOPROLOL TARTRATE 50 MG
25 TABLET ORAL
Refills: 0 | Status: DISCONTINUED | OUTPATIENT
Start: 2024-04-16 | End: 2024-04-17

## 2024-04-16 RX ORDER — METOPROLOL TARTRATE 50 MG
1 TABLET ORAL
Refills: 0 | DISCHARGE

## 2024-04-16 RX ORDER — DEXTROSE 10 % IN WATER 10 %
125 INTRAVENOUS SOLUTION INTRAVENOUS ONCE
Refills: 0 | Status: DISCONTINUED | OUTPATIENT
Start: 2024-04-16 | End: 2024-04-17

## 2024-04-16 RX ORDER — ASPIRIN/CALCIUM CARB/MAGNESIUM 324 MG
162 TABLET ORAL ONCE
Refills: 0 | Status: COMPLETED | OUTPATIENT
Start: 2024-04-16 | End: 2024-04-16

## 2024-04-16 RX ADMIN — Medication 162 MILLIGRAM(S): at 12:47

## 2024-04-16 NOTE — ED ADULT TRIAGE NOTE - CHIEF COMPLAINT QUOTE
patient c/o chest pain since last night, patient states he was going to see his PCP for it today but was advised to come to the ER. also endorsing difficulty breathing. past medical history of afib, 3 stents.

## 2024-04-16 NOTE — ED ADULT NURSE NOTE - OBJECTIVE STATEMENT
Patient came in with the complaints of left sided chest pain ,sob with left arm numbness started last night. As per Patient his cardiologist advised him to come to ED. No other complaints. Patient is placed on cardiac monitor with continues pulse ox. Awaiting for MD to see and further orders. h/o 3 cardiac stent placed in past and Afib. Nursing care continues

## 2024-04-16 NOTE — ED PROVIDER NOTE - ATTENDING CONTRIBUTION TO CARE
Pt was seen and evaluated by me. Pt is a 68 y/o male with PMHx of A-fib, CAD s/p stents X 3, HTN, HLD, and GERD who presented to the ED for chest pain X 1 day. Pt states starting last night having chest discomfort with radiation to left arm. Pt called his PCP who said to come to the ED. Pt denies any headache, neck pain, fever, chills, nausea, vomiting, or abd pain. Pt will admit to episodes of SOB and states getting tired easy.   VITALS: Vitals have been reviewed.  GEN APPEARANCE: Alert and cooperative, non-toxic appearing and in NAD  HEAD: Atraumatic, normocephalic.   EYES: PERRL, EOMI.   EARS: Gross hearing intact.   NOSE: No nasal discharge.   THROAT: MMM. Oral cavity and pharynx normal.   CV: RRR, S1S2, no c/r/m/g. No cyanosis or pallor. E  LUNGS: CTAB. No wheezing. No rales. No rhonchi. No diminished breath sounds.   ABDOMEN: Soft, NTND. No guarding or rebound.   MSK/EXT: Spine appears normal, no spine point tenderness.   NEURO: Alert, follows commands. Speech normal. Sensation and motor normal x4 extremities.   SKIN: Normal color for race, warm, dry and intact. No evidence of rash.

## 2024-04-16 NOTE — H&P ADULT - NSHPLABSRESULTS_GEN_ALL_CORE
(04-16 @ 12:51)                      15.1  7.36 )-----------( 188                 43.2    Neutrophils = 4.58 (62.2%)  Lymphocytes = 2.01 (27.3%)  Eosinophils = 0.04 (0.5%)  Basophils = 0.07 (1.0%)  Monocytes = 0.61 (8.3%)  Bands = --%    04-16    138  |  105  |  16  ----------------------------<  133<H>  4.3   |  24  |  0.91    Ca    9.2      16 Apr 2024 12:51    TPro  7.3  /  Alb  4.2  /  TBili  0.7  /  DBili  x   /  AST  16  /  ALT  26  /  AlkPhos  91  04-16    ( 16 Apr 2024 12:51 )   PT: 13.0 sec;   INR: 1.16 ratio;       PTT:34.5 sec      Urinalysis Basic - ( 16 Apr 2024 12:51 )    Color: x / Appearance: x / SG: x / pH: x  Gluc: 133 mg/dL / Ketone: x  / Bili: x / Urobili: x   Blood: x / Protein: x / Nitrite: x   Leuk Esterase: x / RBC: x / WBC x   Sq Epi: x / Non Sq Epi: x / Bacteria: x      < from: Xray Chest 1 View- PORTABLE-Urgent (Xray Chest 1 View- PORTABLE-Urgent .) (04.16.24 @ 14:24) >    IMPRESSION: Clear lungs    < end of copied text >        Labs personally reviewed  Imaging reviewed  EKG: NSR with PVCs, no acute ischemic changes

## 2024-04-16 NOTE — H&P ADULT - NSHPPHYSICALEXAM_GEN_ALL_CORE
GENERAL APPEARANCE: Well developed, alert and cooperative. NAD.   HEENT:  PERRL, EOMI. External auditory canals normal, hearing grossly intact.  NECK: Neck supple, non-tender   CARDIAC: Normal S1 and S2. No S3, S4 or murmurs. Rhythm is regular. Reproducible chest soreness on palpation   LUNGS: Clear to auscultation and percussion without rales, rhonchi, wheezing or diminished breath sounds.  ABDOMEN: Positive bowel sounds. Soft, nondistended, nontender. No guarding or rebound.   MUSCULOSKELETAL: ROM intact spine and extremities. No joint erythema or tenderness.   EXTREMITIES: No significant deformity or joint abnormality. No edema. Peripheral pulses intact.   NEUROLOGICAL: CN II-XII intact. Strength and sensation symmetric and intact throughout.   SKIN: Skin normal color, texture and turgor with no lesions or eruptions.  PSYCHIATRIC: AOx3.Normal affect and behavior.

## 2024-04-16 NOTE — ED PROVIDER NOTE - CONSIDERATION OF ADMISSION OBSERVATION
Consideration of Admission/Observation Given concern for ACS with history of CAD, will require admission for further evaluation

## 2024-04-16 NOTE — H&P ADULT - PROBLEM SELECTOR PLAN 2
-s/p stents >5 years ago  -cardiac workup as above  -c/w Xarelto, metoprolol, losartan and atorvastatin

## 2024-04-16 NOTE — ED PROVIDER NOTE - OBJECTIVE STATEMENT
70 y/o male with PMHx of A-fib, CAD s/p stents X 3, HTN, HLD, and GERD who presented to the ED for chest pain X 1 day. Pt states starting last night having chest discomfort with radiation to left arm. Pt called his PCP who said to come to the ED. Pt denies any headache, neck pain, fever, chills, nausea, vomiting, or abd pain. Pt will admit to episodes of SOB and states getting tired easy.

## 2024-04-16 NOTE — PATIENT PROFILE ADULT - STATED REASON FOR ADMISSION
chest pain, SOB Most recent POCT A1c 8%, likely inaccurate given her labile sugars and frequently low sugars. C peptide 9.7 in May of 2023. Patient likely with gastroparesis fair leading to nausea and vomiting and poor PO and likely hypoglycemia from poor PO and endogenous insulin production. Home medication for gastroparesis: Gabapentin 600mg PO TID and Nortriptyline 10mg PO qhs. Home medications: Ozempic 1g qweek (last injection 4/2), Lantus 40u qhs, Lispro 15u TID with meals. Several episodes of BG <60 since admission, no BG levels >200    - hold off insulin, f/u endo recs  - f/u CELIA antibody, C peptide, A1c     - A1C 8.2 (down from 10.8 in 02/2024); c-peptide 3.1 (down from 9.2 02/2024)  - d/c Ozempic inpatient  - c/w home meds for gastroparesis

## 2024-04-16 NOTE — ED ADULT NURSE NOTE - CAS TRG GENERAL AIRWAY, MLM
Spoke to patient, flare-up in his right great toe. Ok to lower dose to 25 mg tid. He states he usually only has to take it once or twice a day to knock it out.   New script ecribed for lower dose. He will not use it if he doesn't have to   Patent

## 2024-04-16 NOTE — ED PROVIDER NOTE - CLINICAL SUMMARY MEDICAL DECISION MAKING FREE TEXT BOX
70 y/o male with PMHx of A-fib, CAD s/p stents X 3, HTN, HLD, and GERD who presented to the ED for chest pain X 1 day.  Concern for ACS, PNA, Metabolic Derangement  Labs, EKG, CXR, Analgesia

## 2024-04-16 NOTE — ED ADULT NURSE NOTE - NSICDXFAMILYHX_GEN_ALL_CORE_FT
FAMILY HISTORY:  Family history of heart attack    Sibling  Still living? Yes, Estimated age: Age Unknown  Family history of CABG, Age at diagnosis: Age Unknown

## 2024-04-16 NOTE — H&P ADULT - HISTORY OF PRESENT ILLNESS
70 y/o male with PMHx of A-fib on xarelto, CAD s/p stents X 3, HTN, HLD, DM and GERD who presented to the ED for chest pain X 1 day. Pt states starting last night having chest discomfort with radiation to left arm. Lasted for a few hours until presentation to the ED. Described as pressure like pain with associated shortness of breath. Denies chest pain on exertion. Denies any headache, neck pain, fever, chills, nausea, vomiting, or abd pain. Chest pain currently resolved.

## 2024-04-16 NOTE — PATIENT PROFILE ADULT - FALL HARM RISK - UNIVERSAL INTERVENTIONS
Bed in lowest position, wheels locked, appropriate side rails in place/Call bell, personal items and telephone in reach/Instruct patient to call for assistance before getting out of bed or chair/Non-slip footwear when patient is out of bed/[place] to call system/Physically safe environment - no spills, clutter or unnecessary equipment/Purposeful Proactive Rounding/Room/bathroom lighting operational, light cord in reach

## 2024-04-16 NOTE — H&P ADULT - PROBLEM SELECTOR PLAN 1
-Pt reports acute onset non-exertional chest pain last night with radiation to left arm, ongoing until this am  -EKG non ischemic, trops flat  -low c/f ACS however given comorbidities, warrants further workup   -on xarelto  -c/w metoprolol, atorvastatin  -TTE ordered to eval for WMA   -monitor on tele  -mymxlog c/s in am - stress test vs cath

## 2024-04-16 NOTE — H&P ADULT - NSHPREVIEWOFSYSTEMS_GEN_ALL_CORE
CONSTITUTIONAL: +fatigue No weight loss, fever, chills.  HEENT:  Eyes:  No visual loss, blurred vision, double vision or yellow sclerae. Ears, Nose, Throat:  No hearing loss, sneezing, congestion, runny nose or sore throat.  SKIN:  No rash or itching.  CARDIOVASCULAR:  +chest pain No palpitations or edema.  RESPIRATORY:  No shortness of breath, cough or sputum.  GASTROINTESTINAL:  No anorexia, nausea, vomiting or diarrhea. No abdominal pain or blood.  GENITOURINARY:  Denies hematuria, dysuria.   NEUROLOGICAL:  No headache, dizziness, syncope, paralysis, ataxia, numbness or tingling in the extremities. No change in bowel or bladder control.  MUSCULOSKELETAL:  No muscle, back pain, joint pain or stiffness.  PSYCHIATRIC:  No history of depression or anxiety.  ENDOCRINOLOGIC:  No reports of sweating, cold or heat intolerance. No polyuria or polydipsia.  ALLERGIES:  No history of asthma, hives, eczema or rhinitis.

## 2024-04-16 NOTE — PROVIDER CONTACT NOTE (OTHER) - SITUATION
Despite education and teaching, pt refused F/S and Xarelto. Pt stated his A1C is WDL and does not require finger sticks at home. Pt stated he is not eating and does not want Xarelto.

## 2024-04-16 NOTE — H&P ADULT - ASSESSMENT
68 y/o male with PMHx of A-fib on xarelto, CAD s/p stents X 3, HTN, HLD, DM and GERD who presented to the ED for chest pain admitted for cardiac evaluation

## 2024-04-17 ENCOUNTER — RESULT REVIEW (OUTPATIENT)
Age: 69
End: 2024-04-17

## 2024-04-17 ENCOUNTER — TRANSCRIPTION ENCOUNTER (OUTPATIENT)
Age: 69
End: 2024-04-17

## 2024-04-17 VITALS
TEMPERATURE: 98 F | HEART RATE: 74 BPM | SYSTOLIC BLOOD PRESSURE: 134 MMHG | DIASTOLIC BLOOD PRESSURE: 67 MMHG | OXYGEN SATURATION: 98 % | RESPIRATION RATE: 16 BRPM

## 2024-04-17 DIAGNOSIS — R07.9 CHEST PAIN, UNSPECIFIED: ICD-10-CM

## 2024-04-17 DIAGNOSIS — I48.91 UNSPECIFIED ATRIAL FIBRILLATION: ICD-10-CM

## 2024-04-17 DIAGNOSIS — I10 ESSENTIAL (PRIMARY) HYPERTENSION: ICD-10-CM

## 2024-04-17 DIAGNOSIS — I25.10 ATHEROSCLEROTIC HEART DISEASE OF NATIVE CORONARY ARTERY WITHOUT ANGINA PECTORIS: ICD-10-CM

## 2024-04-17 DIAGNOSIS — Z29.9 ENCOUNTER FOR PROPHYLACTIC MEASURES, UNSPECIFIED: ICD-10-CM

## 2024-04-17 LAB
A1C WITH ESTIMATED AVERAGE GLUCOSE RESULT: 6.5 % — HIGH (ref 4–5.6)
ANION GAP SERPL CALC-SCNC: 12 MMOL/L — SIGNIFICANT CHANGE UP (ref 7–14)
BUN SERPL-MCNC: 16 MG/DL — SIGNIFICANT CHANGE UP (ref 7–23)
CALCIUM SERPL-MCNC: 8.7 MG/DL — SIGNIFICANT CHANGE UP (ref 8.4–10.5)
CHLORIDE SERPL-SCNC: 106 MMOL/L — SIGNIFICANT CHANGE UP (ref 98–107)
CHOLEST SERPL-MCNC: 120 MG/DL — SIGNIFICANT CHANGE UP
CO2 SERPL-SCNC: 20 MMOL/L — LOW (ref 22–31)
CREAT SERPL-MCNC: 0.81 MG/DL — SIGNIFICANT CHANGE UP (ref 0.5–1.3)
EGFR: 95 ML/MIN/1.73M2 — SIGNIFICANT CHANGE UP
ESTIMATED AVERAGE GLUCOSE: 140 — SIGNIFICANT CHANGE UP
GLUCOSE SERPL-MCNC: 114 MG/DL — HIGH (ref 70–99)
HCT VFR BLD CALC: 43.2 % — SIGNIFICANT CHANGE UP (ref 39–50)
HDLC SERPL-MCNC: 30 MG/DL — LOW
HGB BLD-MCNC: 15.5 G/DL — SIGNIFICANT CHANGE UP (ref 13–17)
LIPID PNL WITH DIRECT LDL SERPL: 70 MG/DL — SIGNIFICANT CHANGE UP
MAGNESIUM SERPL-MCNC: 1.8 MG/DL — SIGNIFICANT CHANGE UP (ref 1.6–2.6)
MCHC RBC-ENTMCNC: 31.4 PG — SIGNIFICANT CHANGE UP (ref 27–34)
MCHC RBC-ENTMCNC: 35.9 GM/DL — SIGNIFICANT CHANGE UP (ref 32–36)
MCV RBC AUTO: 87.4 FL — SIGNIFICANT CHANGE UP (ref 80–100)
NON HDL CHOLESTEROL: 90 MG/DL — SIGNIFICANT CHANGE UP
NRBC # BLD: 0 /100 WBCS — SIGNIFICANT CHANGE UP (ref 0–0)
NRBC # FLD: 0 K/UL — SIGNIFICANT CHANGE UP (ref 0–0)
PHOSPHATE SERPL-MCNC: 2.3 MG/DL — LOW (ref 2.5–4.5)
PLATELET # BLD AUTO: 169 K/UL — SIGNIFICANT CHANGE UP (ref 150–400)
POTASSIUM SERPL-MCNC: 4 MMOL/L — SIGNIFICANT CHANGE UP (ref 3.5–5.3)
POTASSIUM SERPL-SCNC: 4 MMOL/L — SIGNIFICANT CHANGE UP (ref 3.5–5.3)
RBC # BLD: 4.94 M/UL — SIGNIFICANT CHANGE UP (ref 4.2–5.8)
RBC # FLD: 12.9 % — SIGNIFICANT CHANGE UP (ref 10.3–14.5)
SODIUM SERPL-SCNC: 138 MMOL/L — SIGNIFICANT CHANGE UP (ref 135–145)
TRIGL SERPL-MCNC: 102 MG/DL — SIGNIFICANT CHANGE UP
TSH SERPL-MCNC: 1.42 UIU/ML — SIGNIFICANT CHANGE UP (ref 0.27–4.2)
WBC # BLD: 7.64 K/UL — SIGNIFICANT CHANGE UP (ref 3.8–10.5)
WBC # FLD AUTO: 7.64 K/UL — SIGNIFICANT CHANGE UP (ref 3.8–10.5)

## 2024-04-17 PROCEDURE — 93306 TTE W/DOPPLER COMPLETE: CPT | Mod: 26

## 2024-04-17 PROCEDURE — 99222 1ST HOSP IP/OBS MODERATE 55: CPT

## 2024-04-17 PROCEDURE — 99239 HOSP IP/OBS DSCHRG MGMT >30: CPT

## 2024-04-17 RX ORDER — INFLUENZA VIRUS VACCINE 15; 15; 15; 15 UG/.5ML; UG/.5ML; UG/.5ML; UG/.5ML
0.7 SUSPENSION INTRAMUSCULAR ONCE
Refills: 0 | Status: DISCONTINUED | OUTPATIENT
Start: 2024-04-17 | End: 2024-04-17

## 2024-04-17 RX ORDER — LANOLIN ALCOHOL/MO/W.PET/CERES
1 CREAM (GRAM) TOPICAL
Qty: 0 | Refills: 0 | DISCHARGE
Start: 2024-04-17

## 2024-04-17 RX ADMIN — Medication 25 MILLIGRAM(S): at 05:19

## 2024-04-17 RX ADMIN — LOSARTAN POTASSIUM 50 MILLIGRAM(S): 100 TABLET, FILM COATED ORAL at 05:19

## 2024-04-17 RX ADMIN — PANTOPRAZOLE SODIUM 40 MILLIGRAM(S): 20 TABLET, DELAYED RELEASE ORAL at 05:19

## 2024-04-17 NOTE — PROVIDER CONTACT NOTE (OTHER) - BACKGROUND
70 Y/O M. PMH of CAD S/P stents, HTN  and HLD.
PMH A-fib on Zarelto, HTN, HLD, DM and Gerd. Admitted for acute ischemic heart disease. Patient is A&ox4.

## 2024-04-17 NOTE — DISCHARGE NOTE PROVIDER - CARE PROVIDER_API CALL
Сергей Long  Cardiovascular Disease  [address] [place] [address]  Phone: [phone]  Fax: [phone]  Follow Up Time:

## 2024-04-17 NOTE — DISCHARGE NOTE PROVIDER - HOSPITAL COURSE
68 y/o male with PMHx of A-fib on xarelto, CAD s/p stents X 3, HTN, HLD, DM and GERD who presented to the ED for chest pain X 1 day. Pt states starting last night having chest discomfort with radiation to left arm. Lasted for a few hours until presentation to the ED. Described as pressure like pain with associated shortness of breath. Denies chest pain on exertion. Denies any headache, neck pain, fever, chills, nausea, vomiting, or abd pain. Chest pain currently resolved.     #Chest pain-Reports acute onset non-exertional chest pain last night with radiation to left arm, ongoing until day of admission. Suspect MSK. Low c/f ACS  EKG non ischemic, trops 7--8. However given comorbidities, warrants further workup. On xarelto, c/w metoprolol, atorvastatin  FU TTE ordered to [facility] for WMA   Cards consulted, Appreciate recs- Patient would rather get nuclear testing as outpatient with his cardiologist. This is reasonable as patient will get close follow up and is now chest pain free.    # CAD (coronary artery disease)-s/p stents >5 years ago. Reviewed chart. Cardiac cath in 2014- normal coronaries. Was advised medical therapy and continued risk factor  modification. Reports he had a stress test last year which was WNL. c/w Xarelto, metoprolol, losartan and atorvastatin.    # History of Chronic Afib- c/w xarelto,  c/w metoprolol 25mg BID for rate control.    #H/o Hypertension-c/w metoprolol and losartan.             68 y/o male with PMHx of A-fib on xarelto, CAD s/p stents X 3, HTN, HLD, DM and GERD who presented to the ED for chest pain X 1 day. Pt states starting last night having chest discomfort with radiation to left arm. Lasted for a few hours until presentation to the ED. Described as pressure like pain with associated shortness of breath. Denies chest pain on exertion. Denies any headache, neck pain, fever, chills, nausea, vomiting, or abd pain. Chest pain currently resolved.     #Chest pain-Reports acute onset non-exertional chest pain last night with radiation to left arm, ongoing until day of admission. Suspect MSK. Low c/f ACS  EKG non ischemic, trops 7--8. However given comorbidities, warrants further workup. On xarelto, c/w metoprolol, atorvastatin  TTE WNL. Cards cleared pt for discharge, Patient would rather get nuclear testing as outpatient with his cardiologist. This is reasonable as patient will get close follow up and is now chest pain free.    # CAD (coronary artery disease)-s/p stents >5 years ago. Reviewed chart. Cardiac cath in 2014- normal coronaries. Was advised medical therapy and continued risk factor  modification. Reports he had a stress test last year which was WNL. c/w Xarelto, metoprolol, losartan and atorvastatin.    # History of Chronic Afib- c/w xarelto,  c/w metoprolol 25mg BID for rate control.    #H/o Hypertension-c/w metoprolol and losartan.

## 2024-04-17 NOTE — DISCHARGE NOTE PROVIDER - NSDCCPTREATMENT_GEN_ALL_CORE_FT
PRINCIPAL PROCEDURE  Procedure: Transthoracic echocardiography (TTE)  Findings and Treatment: 1. Left ventricular cavity is normal in size. Left ventricular wall thickness is normal. Left ventricular systolic function is normal with an ejection fraction of 69 % by Arellano's method of disks. There are no regional wall motion abnormalities seen.   2. Normal left ventricular diastolic function, with normal filling pressure.   3. Normal right ventricular cavity size, with normal wall thickness, and normal systolic function.   4. Normal left and right atrial size.   5. The aortic valve is tricuspid with normal leaflet excursion. There is calcification of the aortic valve leaflets. There is no aortic valve stenosis. There is mild aortic regurgitation.   6. No pericardial effusion seen.

## 2024-04-17 NOTE — PROGRESS NOTE ADULT - PROBLEM SELECTOR PLAN 2
s/p stents >5 years ago  Reviewed chart. Cardiac cath in 2014- normal coronaries. Was advised medical therapy and continued risk factor  modification  c/w Xarelto, metoprolol, losartan and atorvastatin s/p stents >5 years ago  Reviewed chart. Cardiac cath in 2014- normal coronaries. Was advised medical therapy and continued risk factor  modification  Reports he had a stress test last year which was WNL  c/w Xarelto, metoprolol, losartan and atorvastatin

## 2024-04-17 NOTE — PROGRESS NOTE ADULT - SUBJECTIVE AND OBJECTIVE BOX
PROGRESS NOTE:     Patient is a 69y old  Male who presents with a chief complaint of chest pain (17 Apr 2024 10:23)      SUBJECTIVE / OVERNIGHT EVENTS:    ADDITIONAL REVIEW OF SYSTEMS:    MEDICATIONS  (STANDING):  atorvastatin 20 milliGRAM(s) Oral at bedtime  dextrose 10% Bolus 125 milliLiter(s) IV Bolus once  dextrose 5%. 1000 milliLiter(s) (100 mL/Hr) IV Continuous <Continuous>  dextrose 5%. 1000 milliLiter(s) (50 mL/Hr) IV Continuous <Continuous>  dextrose 50% Injectable 25 Gram(s) IV Push once  dextrose 50% Injectable 12.5 Gram(s) IV Push once  glucagon  Injectable 1 milliGRAM(s) IntraMuscular once  influenza  Vaccine (HIGH DOSE) 0.7 milliLiter(s) IntraMuscular once  insulin lispro (ADMELOG) corrective regimen sliding scale   SubCutaneous three times a day before meals  insulin lispro (ADMELOG) corrective regimen sliding scale   SubCutaneous at bedtime  losartan 50 milliGRAM(s) Oral daily  metoprolol tartrate 25 milliGRAM(s) Oral two times a day  pantoprazole    Tablet 40 milliGRAM(s) Oral before breakfast  rivaroxaban 20 milliGRAM(s) Oral with dinner    MEDICATIONS  (PRN):  acetaminophen     Tablet .. 650 milliGRAM(s) Oral every 6 hours PRN Temp greater or equal to 38C (100.4F), Mild Pain (1 - 3)  aluminum hydroxide/magnesium hydroxide/simethicone Suspension 30 milliLiter(s) Oral every 4 hours PRN Dyspepsia  dextrose Oral Gel 15 Gram(s) Oral once PRN Blood Glucose LESS THAN 70 milliGRAM(s)/deciliter  melatonin 3 milliGRAM(s) Oral at bedtime PRN Insomnia  ondansetron Injectable 4 milliGRAM(s) IV Push every 8 hours PRN Nausea and/or Vomiting      CAPILLARY BLOOD GLUCOSE        I&O's Summary      PHYSICAL EXAM:  Vital Signs Last 24 Hrs  T(C): 36.5 (17 Apr 2024 09:06), Max: 36.7 (16 Apr 2024 18:45)  T(F): 97.7 (17 Apr 2024 09:06), Max: 98 (16 Apr 2024 18:45)  HR: 72 (17 Apr 2024 09:06) (62 - 83)  BP: 140/70 (17 Apr 2024 09:06) (120/71 - 166/75)  BP(mean): 82 (16 Apr 2024 18:45) (82 - 88)  RR: 17 (17 Apr 2024 09:06) (16 - 18)  SpO2: 97% (17 Apr 2024 09:06) (95% - 100%)    Parameters below as of 17 Apr 2024 09:06  Patient On (Oxygen Delivery Method): room air        CONSTITUTIONAL: NAD, well-developed  RESPIRATORY: Normal respiratory effort; lungs are clear to auscultation bilaterally  CARDIOVASCULAR: Regular rate and rhythm, normal S1 and S2, no murmur/rub/gallop; No lower extremity edema; Peripheral pulses are 2+ bilaterally  ABDOMEN: Nontender to palpation, normoactive bowel sounds, no rebound/guarding; No hepatosplenomegaly  MUSCLOSKELETAL: no clubbing or cyanosis of digits; no joint swelling or tenderness to palpation  PSYCH: A+O to person, place, and time; affect appropriate  NEURO:  SKIN:    LABS:                        15.5   7.64  )-----------( 169      ( 17 Apr 2024 05:32 )             43.2     04-17    138  |  106  |  16  ----------------------------<  114<H>  4.0   |  20<L>  |  0.81    Ca    8.7      17 Apr 2024 05:32  Phos  2.3     04-17  Mg     1.80     04-17    TPro  7.3  /  Alb  4.2  /  TBili  0.7  /  DBili  x   /  AST  16  /  ALT  26  /  AlkPhos  91  04-16    PT/INR - ( 16 Apr 2024 12:51 )   PT: 13.0 sec;   INR: 1.16 ratio         PTT - ( 16 Apr 2024 12:51 )  PTT:34.5 sec      Urinalysis Basic - ( 17 Apr 2024 05:32 )    Color: x / Appearance: x / SG: x / pH: x  Gluc: 114 mg/dL / Ketone: x  / Bili: x / Urobili: x   Blood: x / Protein: x / Nitrite: x   Leuk Esterase: x / RBC: x / WBC x   Sq Epi: x / Non Sq Epi: x / Bacteria: x          RADIOLOGY & ADDITIONAL TESTS:  Results Reviewed:   Imaging Personally Reviewed:  Electrocardiogram Personally Reviewed:    COORDINATION OF CARE:  Care Discussed with Consultants/Other Providers [Y/N]:  Prior or Outpatient Records Reviewed [Y/N]:   Melida Cohen  Hospitalist  Pager- [phone]    PROGRESS NOTE:     Patient is a 69y old  Male who presents with a chief complaint of chest pain (17 Apr 2024 10:23)      SUBJECTIVE / OVERNIGHT EVENTS: Pt seen and examined by me   Admitted for chest pain which has resolved. Pt reports he had chest pain across the chest, started after he lifted some boxes  Pain resolved since his admission. Denies SOB or palpitations   Ambulated in the hallway    ADDITIONAL REVIEW OF SYSTEMS:    MEDICATIONS  (STANDING):  atorvastatin 20 milliGRAM(s) Oral at bedtime  dextrose 10% Bolus 125 milliLiter(s) IV Bolus once  dextrose 5%. 1000 milliLiter(s) (100 mL/Hr) IV Continuous <Continuous>  dextrose 5%. 1000 milliLiter(s) (50 mL/Hr) IV Continuous <Continuous>  dextrose 50% Injectable 25 Gram(s) IV Push once  dextrose 50% Injectable 12.5 Gram(s) IV Push once  glucagon  Injectable 1 milliGRAM(s) IntraMuscular once  influenza  Vaccine (HIGH DOSE) 0.7 milliLiter(s) IntraMuscular once  insulin lispro (ADMELOG) corrective regimen sliding scale   SubCutaneous three times a day before meals  insulin lispro (ADMELOG) corrective regimen sliding scale   SubCutaneous at bedtime  losartan 50 milliGRAM(s) Oral daily  metoprolol tartrate 25 milliGRAM(s) Oral two times a day  pantoprazole    Tablet 40 milliGRAM(s) Oral before breakfast  rivaroxaban 20 milliGRAM(s) Oral with dinner    MEDICATIONS  (PRN):  acetaminophen     Tablet .. 650 milliGRAM(s) Oral every 6 hours PRN Temp greater or equal to 38C (100.4F), Mild Pain (1 - 3)  aluminum hydroxide/magnesium hydroxide/simethicone Suspension 30 milliLiter(s) Oral every 4 hours PRN Dyspepsia  dextrose Oral Gel 15 Gram(s) Oral once PRN Blood Glucose LESS THAN 70 milliGRAM(s)/deciliter  melatonin 3 milliGRAM(s) Oral at bedtime PRN Insomnia  ondansetron Injectable 4 milliGRAM(s) IV Push every 8 hours PRN Nausea and/or Vomiting      CAPILLARY BLOOD GLUCOSE      I&O's Summary      PHYSICAL EXAM:  Vital Signs Last 24 Hrs  T(C): 36.5 (17 Apr 2024 09:06), Max: 36.7 (16 Apr 2024 18:45)  T(F): 97.7 (17 Apr 2024 09:06), Max: 98 (16 Apr 2024 18:45)  HR: 72 (17 Apr 2024 09:06) (62 - 83)  BP: 140/70 (17 Apr 2024 09:06) (120/71 - 166/75)  BP(mean): 82 (16 Apr 2024 18:45) (82 - 88)  RR: 17 (17 Apr 2024 09:06) (16 - 18)  SpO2: 97% (17 Apr 2024 09:06) (95% - 100%)    Parameters below as of 17 Apr 2024 09:06  Patient On (Oxygen Delivery Method): room air        CONSTITUTIONAL: NAD, well-developed  RESPIRATORY: Normal respiratory effort; lungs are clear to auscultation bilaterally  CARDIOVASCULAR: Regular rate and rhythm, normal S1 and S2, no murmur/rub/gallop; No lower extremity edema; Peripheral pulses are 2+ bilaterally  ABDOMEN: Nontender to palpation, normoactive bowel sounds, no rebound/guarding; No hepatosplenomegaly  MUSCLOSKELETAL: no clubbing or cyanosis of digits; no joint swelling or tenderness to palpation  PSYCH: A+O to person, place, and time; affect appropriate  NEURO: No focal deficits  SKIN: No rash     LABS:                        15.5   7.64  )-----------( 169      ( 17 Apr 2024 05:32 )             43.2     04-17    138  |  106  |  16  ----------------------------<  114<H>  4.0   |  20<L>  |  0.81    Ca    8.7      17 Apr 2024 05:32  Phos  2.3     04-17  Mg     1.80     04-17    TPro  7.3  /  Alb  4.2  /  TBili  0.7  /  DBili  x   /  AST  16  /  ALT  26  /  AlkPhos  91  04-16    PT/INR - ( 16 Apr 2024 12:51 )   PT: 13.0 sec;   INR: 1.16 ratio         PTT - ( 16 Apr 2024 12:51 )  PTT:34.5 sec      Urinalysis Basic - ( 17 Apr 2024 05:32 )    Color: x / Appearance: x / SG: x / pH: x  Gluc: 114 mg/dL / Ketone: x  / Bili: x / Urobili: x   Blood: x / Protein: x / Nitrite: x   Leuk Esterase: x / RBC: x / WBC x   Sq Epi: x / Non Sq Epi: x / Bacteria: x          RADIOLOGY & ADDITIONAL TESTS:  Results Reviewed:   Imaging Personally Reviewed:  Electrocardiogram Personally Reviewed:    COORDINATION OF CARE:  Care Discussed with Consultants/Other Providers [Y/N]:  Prior or Outpatient Records Reviewed [Y/N]:

## 2024-04-17 NOTE — DISCHARGE NOTE NURSING/CASE MANAGEMENT/SOCIAL WORK - PATIENT PORTAL LINK FT
You can access the FollowMyHealth Patient Portal offered by [facility] by registering at the following website: [email or web address]. By joining [facility]’s FollowMyHealth portal, you will also be able to view your health information using other applications (apps) compatible with our system.

## 2024-04-17 NOTE — CONSULT NOTE ADULT - SUBJECTIVE AND OBJECTIVE BOX
Patient seen and evaluated at bedside    Chief Complaint: chest pain    HPI:  70 y/o male with PMHx of A-fib on xarelto, CAD s/p stents X 3, HTN, HLD, DM and GERD who presented to the ED for chest pain X 1 day. Pt states starting last night having chest discomfort with radiation to left arm. Lasted for a few hours until presentation to the ED. Described as pressure like pain with associated shortness of breath. Denies chest pain on exertion. Denies any headache, neck pain, fever, chills, nausea, vomiting, or abd pain. Chest pain currently resolved.  (2024 23:41)      PMHx:   HTN (hypertension)    HLD (hyperlipidemia)    Osteomyelitis    GERD (gastroesophageal reflux disease)        PSHx:   No significant past surgical history    History of hand surgery        Allergies:  No Known Allergies      Home Meds:  · 	Metoprolol Tartrate 25 mg oral tablet: Last Dose Taken:  , 1 tab(s) orally 2 times a day  · 	metFORMIN 500 mg oral tablet: Last Dose Taken:  , 1 tab(s) orally once a day  · 	losartan 50 mg oral tablet: Last Dose Taken:  , 1 tab(s) orally once a day  · 	atorvastatin 20 mg oral tablet: Last Dose Taken:  , 1 tab(s) orally once a day (at bedtime)  · 	NexIUM 40 mg oral delayed release capsule: Last Dose Taken:  , 1 cap(s) orally once a day  · 	ezetimibe 10 mg oral tablet: Last Dose Taken:  , 1 tab(s) orally once a day  · 	Xarelto 20 mg oral tablet: Last Dose Taken:  , 1 tab(s) orally once a day (at bedtime)        Current Medications:   acetaminophen     Tablet .. 650 milliGRAM(s) Oral every 6 hours PRN  aluminum hydroxide/magnesium hydroxide/simethicone Suspension 30 milliLiter(s) Oral every 4 hours PRN  atorvastatin 20 milliGRAM(s) Oral at bedtime  dextrose 10% Bolus 125 milliLiter(s) IV Bolus once  dextrose 5%. 1000 milliLiter(s) IV Continuous <Continuous>  dextrose 5%. 1000 milliLiter(s) IV Continuous <Continuous>  dextrose 50% Injectable 25 Gram(s) IV Push once  dextrose 50% Injectable 12.5 Gram(s) IV Push once  dextrose Oral Gel 15 Gram(s) Oral once PRN  glucagon  Injectable 1 milliGRAM(s) IntraMuscular once  influenza  Vaccine (HIGH DOSE) 0.7 milliLiter(s) IntraMuscular once  insulin lispro (ADMELOG) corrective regimen sliding scale   SubCutaneous at bedtime  insulin lispro (ADMELOG) corrective regimen sliding scale   SubCutaneous three times a day before meals  losartan 50 milliGRAM(s) Oral daily  melatonin 3 milliGRAM(s) Oral at bedtime PRN  metoprolol tartrate 25 milliGRAM(s) Oral two times a day  ondansetron Injectable 4 milliGRAM(s) IV Push every 8 hours PRN  pantoprazole    Tablet 40 milliGRAM(s) Oral before breakfast  rivaroxaban 20 milliGRAM(s) Oral with dinner      FAMILY HISTORY:  Family history of heart attack    Family history of CABG (Sibling)        Social History:  Smoking History:  Alcohol Use:  Drug Use:    REVIEW OF SYSTEMS:  Constitutional:     [ ] negative [ ] fevers [ ] chills [ ] weight loss [ ] weight gain  HEENT:                  [ ] negative [ ] dry eyes [ ] eye irritation [ ] postnasal drip [ ] nasal congestion  CV:                         [ ] negative  [ ] chest pain [ ] orthopnea [ ] palpitations [ ] murmur  Resp:                     [ ] negative [ ] cough [ ] shortness of breath [ ] dyspnea [ ] wheezing [ ] sputum [ ]hemoptysis  GI:                          [ ] negative [ ] nausea [ ] vomiting [ ] diarrhea [ ] constipation [ ] abd pain [ ] dysphagia   :                        [ ] negative [ ] dysuria [ ] nocturia [ ] hematuria [ ] increased urinary frequency  Musculoskeletal: [ ] negative [ ] back pain [ ] myalgias [ ] arthralgias [ ] fracture  Skin:                       [ ] negative [ ] rash [ ] itch  Neurological:        [ ] negative [ ] headache [ ] dizziness [ ] syncope [ ] weakness [ ] numbness  Psychiatric:           [ ] negative [ ] anxiety [ ] depression  Endocrine:            [ ] negative [ ] diabetes [ ] thyroid problem  Heme/Lymph:      [ ] negative [ ] anemia [ ] bleeding problem  Allergic/Immune: [ ] negative [ ] itchy eyes [ ] nasal discharge [ ] hives [ ] angioedema    [ ] All other systems negative  [ ] Unable to assess ROS due to      Physical Exam:  T(F): 97.7 (-), Max: 98 (-16)  HR: 72 () (62 - 83)  BP: 140/70 () (120/71 - 166/75)  RR: 17 (-)  SpO2: 97% (-)  GENERAL: No acute distress, well-developed  HEAD:  Atraumatic, Normocephalic  ENT: EOMI, PERRLA, conjunctiva and sclera clear, Neck supple, No JVD, moist mucosa  CHEST/LUNG: Clear to auscultation bilaterally; No wheeze, equal breath sounds bilaterally   BACK: No spinal tenderness  HEART: Regular rate and rhythm; No murmurs, rubs, or gallops  ABDOMEN: Soft, Nontender, Nondistended; Bowel sounds present  EXTREMITIES:  No clubbing, cyanosis, or edema  PSYCH: Nl behavior, nl affect  NEUROLOGY: AAOx3, non-focal, cranial nerves intact  SKIN: Normal color, No rashes or lesions  LINES:    Cardiovascular Diagnostic Testing:    ECG: Personally reviewed:    Echo: Personally reviewed:      Patient name: MERCEDES VALERO  YOB: 1955   Age: 63 (M)   MR#: [id number]  Study Date: 10/10/2018  Location: [facility] Sonographer: Fariha Muir RDCS  Study quality: Technically Difficult  Referring Physician: Matt Mason MD  Blood Pressure: 125/86 mmHg  Height: 178 cm  Weight: 102 kg  BSA: 2.2 m2  ------------------------------------------------------------------------  PROCEDURE: Transthoracic echocardiogram with 2-D, M-Mode  and complete spectral and color flow Doppler.  INDICATION: Angina pectoris, unspecified (I20.9)  ------------------------------------------------------------------------  DIMENSIONS:  Dimensions:     Normal Values:  LA:     3.6 cm    2.0 - 4.0 cm  Ao:     3.0 cm    2.0 - 3.8 cm  SEPTUM: 0.7 cm   0.6 - 1.2 cm  PWT:    0.7 cm    0.6 - 1.1 cm  LVIDd:  5.1 cm    3.0 - 5.6 cm  LVIDs:  3.3 cm    1.8 - 4.0 cm  Derived Variables:  LVMI: 54 g/m2  RWT: 0.27  Fractional short: 35 %  ------------------------------------------------------------------------  OBSERVATIONS:  Mitral Valve: Mitral annular calcification, otherwise  normal mitral valve. Minimal mitral regurgitation.  Aortic Root: Normal aortic root.  Aortic Valve: Aortic valve leaflet morphology not well  visualized.  Left Atrium: Normalleft atrium.  Left Ventricle: Endocardium not well visualized; unable to  evaluate left ventricular systolic function. Normal left  ventricular internal dimensions and wall thicknesses.  Right Heart: Normal right atrium. The right ventricle is  not well visualized; grossly normal right ventricular  systolic function. Normal tricuspid valve. Minimal  tricuspid regurgitation. Normal pulmonic valve.  Pericardium/PleuraNormal pericardium with no pericardial  effusion.  ------------------------------------------------------------------------  CONCLUSIONS:  Technically difficult study.  1. Mitral annular calcification, otherwise normal mitral  valve. Minimal mitral regurgitation.  2. Normal left ventricular internal dimensions and wall  thicknesses.  3. Endocardium not well visualized; unable to evaluate left  ventricular systolic function.  4. The right ventricle is not well visualized; grossly  normal right ventricular systolic function.  Consider limited repeat imaging with IV contrast  administration for improved evaluation of LV systolic  function, if clinically indicated.  ------------------------------------------------------------------------  Confirmed on  10/10/2018 - 15:38:17 by Adrian Garcia M.D.  ------------------------------------------------------------------------      Stress Testing:    ------------------------------------------------------------------------  IMPRESSIONS:Abnormal Study  * Myocardial Perfusion SPECT results are abnormal at 85 %  of MPHR.  * Reviewof raw data shows: The study is of good technical  quality.  * There is a small to medium sized, moderate to severe  defect in apical and inferolateral wall that was partially  reversible, suggestive of infarct with mild to moderate  (mostly apical)jeff-infarct ischemia  * Post-stress gated wall motion analysis was performed  (LVEF = 48 %;LVEDV = 107 ml.), revealing focal  inferolateral akinesis.  RV function appeared normal.  ADDENDUM 10/12/2018: include information from rest imaging  ------------------------------------------------------------------------  Revised on  10/12/2018 - 17:00:44 by Karthikeyan Hodge M.D.  Confirmed on  10/12/2018 - 17:10:26 by Karthikeyan Hodge M.D.  ------------------------------------------------------------------------    Imaging:    CXR: Personally reviewed    ACC: [id number] EXAM: XR CHEST PORTABLE URGENT 1V ORDERED BY: CAMPOS CROWELL    PROCEDURE DATE: 2024        INTERPRETATION: CLINICAL INFORMATION: Dyspnea    Time of Exam: 2024 at 2:20 PM    EXAM: Frontal Chest    FINDINGS:  Both lungs are equally aerated and free of any focal abnormalities.  The heart is not enlarged and there is no effusion or pneumothorax.  The bones show no acute findings.      COMPARISON: 2018        IMPRESSION: Clear lungs    --- End of Report ---    Labs: Personally reviewed                        15.5   7.64  )-----------( 169      ( 2024 05:32 )             43.2         138  |  106  |  16  ----------------------------<  114<H>  4.0   |  20<L>  |  0.81    Ca    8.7      2024 05:32  Phos  2.3       Mg     1.80         TPro  7.3  /  Alb  4.2  /  TBili  0.7  /  DBili  x   /  AST  16  /  ALT  26  /  AlkPhos  91      PT/INR - ( 2024 12:51 )   PT: 13.0 sec;   INR: 1.16 ratio         PTT - ( 2024 12:51 )  PTT:34.5 sec    Total Cholesterol: 120  LDL: --  HDL: 30  T      Thyroid Stimulating Hormone, Serum: 1.42 uIU/mL ( @ 05:32)      CARDIAC MARKERS ( 2024 14:25 )  8 ng/L / x     / x     / x     / x     / x      CARDIAC MARKERS ( 2024 12:51 )  7 ng/L / x     / x     / x     / x     / x          No Known Allergies    acetaminophen     Tablet .. 650 milliGRAM(s) Oral every 6 hours PRN  aluminum hydroxide/magnesium hydroxide/simethicone Suspension 30 milliLiter(s) Oral every 4 hours PRN  atorvastatin 20 milliGRAM(s) Oral at bedtime  dextrose 10% Bolus 125 milliLiter(s) IV Bolus once  dextrose 5%. 1000 milliLiter(s) IV Continuous <Continuous>  dextrose 5%. 1000 milliLiter(s) IV Continuous <Continuous>  dextrose 50% Injectable 25 Gram(s) IV Push once  dextrose 50% Injectable 12.5 Gram(s) IV Push once  dextrose Oral Gel 15 Gram(s) Oral once PRN  glucagon  Injectable 1 milliGRAM(s) IntraMuscular once  influenza  Vaccine (HIGH DOSE) 0.7 milliLiter(s) IntraMuscular once  insulin lispro (ADMELOG) corrective regimen sliding scale   SubCutaneous at bedtime  insulin lispro (ADMELOG) corrective regimen sliding scale   SubCutaneous three times a day before meals  losartan 50 milliGRAM(s) Oral daily  melatonin 3 milliGRAM(s) Oral at bedtime PRN  metoprolol tartrate 25 milliGRAM(s) Oral two times a day  ondansetron Injectable 4 milliGRAM(s) IV Push every 8 hours PRN  pantoprazole    Tablet 40 milliGRAM(s) Oral before breakfast  rivaroxaban 20 milliGRAM(s) Oral with dinner    T(F): 97.7 (), Max: 98 ()  HR: 72 () (62 - 83)  BP: 140/70 () (120/71 - 166/75)  RR: 17 (-)  SpO2: 97% () Patient seen and evaluated at bedside    Chief Complaint: chest pain    HPI:  68 y/o male with PMHx of A-fib on xarelto, CAD s/p stents X 3, HTN, HLD, DM and GERD who presented to the ED for chest pain X 1 day. Pt states starting last night having chest discomfort with radiation to left arm. Lasted for a few hours until presentation to the ED. Described as pressure like pain with associated shortness of breath. Denies chest pain on exertion. Denies any headache, neck pain, fever, chills, nausea, vomiting, or abd pain. Chest pain currently resolved.  (2024 23:41)      PMHx:   HTN (hypertension)    HLD (hyperlipidemia)    Osteomyelitis    GERD (gastroesophageal reflux disease)        PSHx:   No significant past surgical history    History of hand surgery        Allergies:  No Known Allergies      Home Meds:  · 	Metoprolol Tartrate 25 mg oral tablet: Last Dose Taken:  , 1 tab(s) orally 2 times a day  · 	metFORMIN 500 mg oral tablet: Last Dose Taken:  , 1 tab(s) orally once a day  · 	losartan 50 mg oral tablet: Last Dose Taken:  , 1 tab(s) orally once a day  · 	atorvastatin 20 mg oral tablet: Last Dose Taken:  , 1 tab(s) orally once a day (at bedtime)  · 	NexIUM 40 mg oral delayed release capsule: Last Dose Taken:  , 1 cap(s) orally once a day  · 	ezetimibe 10 mg oral tablet: Last Dose Taken:  , 1 tab(s) orally once a day  · 	Xarelto 20 mg oral tablet: Last Dose Taken:  , 1 tab(s) orally once a day (at bedtime)        Current Medications:   acetaminophen     Tablet .. 650 milliGRAM(s) Oral every 6 hours PRN  aluminum hydroxide/magnesium hydroxide/simethicone Suspension 30 milliLiter(s) Oral every 4 hours PRN  atorvastatin 20 milliGRAM(s) Oral at bedtime  dextrose 10% Bolus 125 milliLiter(s) IV Bolus once  dextrose 5%. 1000 milliLiter(s) IV Continuous <Continuous>  dextrose 5%. 1000 milliLiter(s) IV Continuous <Continuous>  dextrose 50% Injectable 25 Gram(s) IV Push once  dextrose 50% Injectable 12.5 Gram(s) IV Push once  dextrose Oral Gel 15 Gram(s) Oral once PRN  glucagon  Injectable 1 milliGRAM(s) IntraMuscular once  influenza  Vaccine (HIGH DOSE) 0.7 milliLiter(s) IntraMuscular once  insulin lispro (ADMELOG) corrective regimen sliding scale   SubCutaneous at bedtime  insulin lispro (ADMELOG) corrective regimen sliding scale   SubCutaneous three times a day before meals  losartan 50 milliGRAM(s) Oral daily  melatonin 3 milliGRAM(s) Oral at bedtime PRN  metoprolol tartrate 25 milliGRAM(s) Oral two times a day  ondansetron Injectable 4 milliGRAM(s) IV Push every 8 hours PRN  pantoprazole    Tablet 40 milliGRAM(s) Oral before breakfast  rivaroxaban 20 milliGRAM(s) Oral with dinner      FAMILY HISTORY:  Family history of heart attack    Family history of CABG (Sibling)        Social History:  Smoking History:  Alcohol Use:  Drug Use:    REVIEW OF SYSTEMS:  Constitutional:     [ ] negative [ ] fevers [ ] chills [ ] weight loss [ ] weight gain  HEENT:                  [ ] negative [ ] dry eyes [ ] eye irritation [ ] postnasal drip [ ] nasal congestion  CV:                         [ ] negative  [ ] chest pain [ ] orthopnea [ ] palpitations [ ] murmur  Resp:                     [ ] negative [ ] cough [ ] shortness of breath [ ] dyspnea [ ] wheezing [ ] sputum [ ]hemoptysis  GI:                          [ ] negative [ ] nausea [ ] vomiting [ ] diarrhea [ ] constipation [ ] abd pain [ ] dysphagia   :                        [ ] negative [ ] dysuria [ ] nocturia [ ] hematuria [ ] increased urinary frequency  Musculoskeletal: [ ] negative [ ] back pain [ ] myalgias [ ] arthralgias [ ] fracture  Skin:                       [ ] negative [ ] rash [ ] itch  Neurological:        [ ] negative [ ] headache [ ] dizziness [ ] syncope [ ] weakness [ ] numbness  Psychiatric:           [ ] negative [ ] anxiety [ ] depression  Endocrine:            [ ] negative [ ] diabetes [ ] thyroid problem  Heme/Lymph:      [ ] negative [ ] anemia [ ] bleeding problem  Allergic/Immune: [ ] negative [ ] itchy eyes [ ] nasal discharge [ ] hives [ ] angioedema    [ ] All other systems negative  [ ] Unable to assess ROS due to      Physical Exam:  T(F): 97.7 (-), Max: 98 (-16)  HR: 72 () (62 - 83)  BP: 140/70 () (120/71 - 166/75)  RR: 17 (-)  SpO2: 97% (-)  GENERAL: No acute distress, well-developed  HEAD:  Atraumatic, Normocephalic  ENT: EOMI, PERRLA, conjunctiva and sclera clear, Neck supple, No JVD, moist mucosa  CHEST/LUNG: Clear to auscultation bilaterally; No wheeze, equal breath sounds bilaterally   BACK: No spinal tenderness  HEART: Regular rate and rhythm; No murmurs, rubs, or gallops  ABDOMEN: Soft, Nontender, Nondistended; Bowel sounds present  EXTREMITIES:  No clubbing, cyanosis, or edema  PSYCH: Nl behavior, nl affect  NEUROLOGY: AAOx3, non-focal, cranial nerves intact  SKIN: Normal color, No rashes or lesions  LINES:    Cardiovascular Diagnostic Testing:    ECG: Personally reviewed:  Sinus rhythm with poor R wave progression and PVC. No ST segment changes    Echo: Personally reviewed:      Patient name: MERCEDES VALERO  YOB: 1955   Age: 63 (M)   MR#: [id number]  Study Date: 10/10/2018  Location: [facility] Sonographer: Fariha Muir  JESUS  Study quality: Technically Difficult  Referring Physician: Matt Mason MD  Blood Pressure: 125/86 mmHg  Height: 178 cm  Weight: 102 kg  BSA: 2.2 m2  ------------------------------------------------------------------------  PROCEDURE: Transthoracic echocardiogram with 2-D, M-Mode  and complete spectral and color flow Doppler.  INDICATION: Angina pectoris, unspecified (I20.9)  ------------------------------------------------------------------------  DIMENSIONS:  Dimensions:     Normal Values:  LA:     3.6 cm    2.0 - 4.0 cm  Ao:     3.0 cm    2.0 - 3.8 cm  SEPTUM: 0.7 cm   0.6 - 1.2 cm  PWT:    0.7 cm    0.6 - 1.1 cm  LVIDd:  5.1 cm    3.0 - 5.6 cm  LVIDs:  3.3 cm    1.8 - 4.0 cm  Derived Variables:  LVMI: 54 g/m2  RWT: 0.27  Fractional short: 35 %  ------------------------------------------------------------------------  OBSERVATIONS:  Mitral Valve: Mitral annular calcification, otherwise  normal mitral valve. Minimal mitral regurgitation.  Aortic Root: Normal aortic root.  Aortic Valve: Aortic valve leaflet morphology not well  visualized.  Left Atrium: Normalleft atrium.  Left Ventricle: Endocardium not well visualized; unable to  evaluate left ventricular systolic function. Normal left  ventricular internal dimensions and wall thicknesses.  Right Heart: Normal right atrium. The right ventricle is  not well visualized; grossly normal right ventricular  systolic function. Normal tricuspid valve. Minimal  tricuspid regurgitation. Normal pulmonic valve.  Pericardium/PleuraNormal pericardium with no pericardial  effusion.  ------------------------------------------------------------------------  CONCLUSIONS:  Technically difficult study.  1. Mitral annular calcification, otherwise normal mitral  valve. Minimal mitral regurgitation.  2. Normal left ventricular internal dimensions and wall  thicknesses.  3. Endocardium not well visualized; unable to evaluate left  ventricular systolic function.  4. The right ventricle is not well visualized; grossly  normal right ventricular systolic function.  Consider limited repeat imaging with IV contrast  administration for improved evaluation of LV systolic  function, if clinically indicated.  ------------------------------------------------------------------------  Confirmed on  10/10/2018 - 15:38:17 by Adrian Garcia M.D.  ------------------------------------------------------------------------      Stress Testing:    ------------------------------------------------------------------------  IMPRESSIONS:Abnormal Study  * Myocardial Perfusion SPECT results are abnormal at 85 %  of MPHR.  * Reviewof raw data shows: The study is of good technical  quality.  * There is a small to medium sized, moderate to severe  defect in apical and inferolateral wall that was partially  reversible, suggestive of infarct with mild to moderate  (mostly apical)jeff-infarct ischemia  * Post-stress gated wall motion analysis was performed  (LVEF = 48 %;LVEDV = 107 ml.), revealing focal  inferolateral akinesis.  RV function appeared normal.  ADDENDUM 10/12/2018: include information from rest imaging  ------------------------------------------------------------------------  Revised on  10/12/2018 - 17:00:44 by Karthikeyan Hodge M.D.  Confirmed on  10/12/2018 - 17:10:26 by Karthikeyan Hodge M.D.  ------------------------------------------------------------------------    Imaging:    CXR: Personally reviewed    ACC: [id number] EXAM: XR CHEST PORTABLE URGENT 1V ORDERED BY: CAMPOS CROWELL    PROCEDURE DATE: 2024        INTERPRETATION: CLINICAL INFORMATION: Dyspnea    Time of Exam: 2024 at 2:20 PM    EXAM: Frontal Chest    FINDINGS:  Both lungs are equally aerated and free of any focal abnormalities.  The heart is not enlarged and there is no effusion or pneumothorax.  The bones show no acute findings.      COMPARISON: 2018        IMPRESSION: Clear lungs    --- End of Report ---    Labs: Personally reviewed                        15.5   7.64  )-----------( 169      ( 2024 05:32 )             43.2     -    138  |  106  |  16  ----------------------------<  114<H>  4.0   |  20<L>  |  0.81    Ca    8.7      2024 05:32  Phos  2.3       Mg     1.80         TPro  7.3  /  Alb  4.2  /  TBili  0.7  /  DBili  x   /  AST  16  /  ALT  26  /  AlkPhos  91  04-16    PT/INR - ( 2024 12:51 )   PT: 13.0 sec;   INR: 1.16 ratio         PTT - ( 2024 12:51 )  PTT:34.5 sec    Total Cholesterol: 120  LDL: --  HDL: 30  T      Thyroid Stimulating Hormone, Serum: 1.42 uIU/mL ( @ 05:32)      CARDIAC MARKERS ( 2024 14:25 )  8 ng/L / x     / x     / x     / x     / x      CARDIAC MARKERS ( 2024 12:51 )  7 ng/L / x     / x     / x     / x     / x          No Known Allergies    acetaminophen     Tablet .. 650 milliGRAM(s) Oral every 6 hours PRN  aluminum hydroxide/magnesium hydroxide/simethicone Suspension 30 milliLiter(s) Oral every 4 hours PRN  atorvastatin 20 milliGRAM(s) Oral at bedtime  dextrose 10% Bolus 125 milliLiter(s) IV Bolus once  dextrose 5%. 1000 milliLiter(s) IV Continuous <Continuous>  dextrose 5%. 1000 milliLiter(s) IV Continuous <Continuous>  dextrose 50% Injectable 25 Gram(s) IV Push once  dextrose 50% Injectable 12.5 Gram(s) IV Push once  dextrose Oral Gel 15 Gram(s) Oral once PRN  glucagon  Injectable 1 milliGRAM(s) IntraMuscular once  influenza  Vaccine (HIGH DOSE) 0.7 milliLiter(s) IntraMuscular once  insulin lispro (ADMELOG) corrective regimen sliding scale   SubCutaneous at bedtime  insulin lispro (ADMELOG) corrective regimen sliding scale   SubCutaneous three times a day before meals  losartan 50 milliGRAM(s) Oral daily  melatonin 3 milliGRAM(s) Oral at bedtime PRN  metoprolol tartrate 25 milliGRAM(s) Oral two times a day  ondansetron Injectable 4 milliGRAM(s) IV Push every 8 hours PRN  pantoprazole    Tablet 40 milliGRAM(s) Oral before breakfast  rivaroxaban 20 milliGRAM(s) Oral with dinner    T(F): 97.7 (-), Max: 98 (-)  HR: 72 () (62 - 83)  BP: 140/70 () (120/71 - 166/75)  RR: 17 (-)  SpO2: 97% () Patient seen and evaluated at bedside    Chief Complaint: chest pain    HPI:  70 y/o male with PMHx of A-fib on xarelto, CAD s/p stents X 3, HTN, HLD, DM and GERD who presented to the ED for chest pain since Saturday. The patient was lifting heavy boxes on Saturday and that night, he felt very tired and felt pressure like pain in his chest. The pain had radiation to left arm. He did not have any associated shortness of breath but described diaphoresis. Denies chest pain on exertion and he says that he has been able to walk without chest pain recently. Denies any headache, neck pain, fever, chills, nausea, vomiting, or abd pain. Chest pain currently resolved.   HE denies any orthopnea, PND or LE edema.       PMHx:   HTN (hypertension)    HLD (hyperlipidemia)    Osteomyelitis    GERD (gastroesophageal reflux disease)        PSHx:   No significant past surgical history    History of hand surgery        Allergies:  No Known Allergies      Home Meds:  · 	Metoprolol Tartrate 25 mg oral tablet: Last Dose Taken:  , 1 tab(s) orally 2 times a day  · 	metFORMIN 500 mg oral tablet: Last Dose Taken:  , 1 tab(s) orally once a day  · 	losartan 50 mg oral tablet: Last Dose Taken:  , 1 tab(s) orally once a day  · 	atorvastatin 20 mg oral tablet: Last Dose Taken:  , 1 tab(s) orally once a day (at bedtime)  · 	NexIUM 40 mg oral delayed release capsule: Last Dose Taken:  , 1 cap(s) orally once a day  · 	ezetimibe 10 mg oral tablet: Last Dose Taken:  , 1 tab(s) orally once a day  · 	Xarelto 20 mg oral tablet: Last Dose Taken:  , 1 tab(s) orally once a day (at bedtime)        Current Medications:   acetaminophen     Tablet .. 650 milliGRAM(s) Oral every 6 hours PRN  aluminum hydroxide/magnesium hydroxide/simethicone Suspension 30 milliLiter(s) Oral every 4 hours PRN  atorvastatin 20 milliGRAM(s) Oral at bedtime  dextrose 10% Bolus 125 milliLiter(s) IV Bolus once  dextrose 5%. 1000 milliLiter(s) IV Continuous <Continuous>  dextrose 5%. 1000 milliLiter(s) IV Continuous <Continuous>  dextrose 50% Injectable 25 Gram(s) IV Push once  dextrose 50% Injectable 12.5 Gram(s) IV Push once  dextrose Oral Gel 15 Gram(s) Oral once PRN  glucagon  Injectable 1 milliGRAM(s) IntraMuscular once  influenza  Vaccine (HIGH DOSE) 0.7 milliLiter(s) IntraMuscular once  insulin lispro (ADMELOG) corrective regimen sliding scale   SubCutaneous at bedtime  insulin lispro (ADMELOG) corrective regimen sliding scale   SubCutaneous three times a day before meals  losartan 50 milliGRAM(s) Oral daily  melatonin 3 milliGRAM(s) Oral at bedtime PRN  metoprolol tartrate 25 milliGRAM(s) Oral two times a day  ondansetron Injectable 4 milliGRAM(s) IV Push every 8 hours PRN  pantoprazole    Tablet 40 milliGRAM(s) Oral before breakfast  rivaroxaban 20 milliGRAM(s) Oral with dinner      FAMILY HISTORY:  Family history of heart attack    Family history of CABG (Sibling)        Social History:  No smoking    REVIEW OF SYSTEMS:  Constitutional:     [ ] negative [ ] fevers [ ] chills [ ] weight loss [ ] weight gain  HEENT:                  [ ] negative [ ] dry eyes [ ] eye irritation [ ] postnasal drip [ ] nasal congestion  CV:                         [ ] negative  [ ] chest pain [ ] orthopnea [ ] palpitations [ ] murmur  Resp:                     [ ] negative [ ] cough [ ] shortness of breath [ ] dyspnea [ ] wheezing [ ] sputum [ ]hemoptysis  GI:                          [ ] negative [ ] nausea [ ] vomiting [ ] diarrhea [ ] constipation [ ] abd pain [ ] dysphagia   :                        [ ] negative [ ] dysuria [ ] nocturia [ ] hematuria [ ] increased urinary frequency  Musculoskeletal: [ ] negative [ ] back pain [ ] myalgias [ ] arthralgias [ ] fracture  Skin:                       [ ] negative [ ] rash [ ] itch  Neurological:        [ ] negative [ ] headache [ ] dizziness [ ] syncope [ ] weakness [ ] numbness  Psychiatric:           [ ] negative [ ] anxiety [ ] depression  Endocrine:            [ ] negative [ ] diabetes [ ] thyroid problem  Heme/Lymph:      [ ] negative [ ] anemia [ ] bleeding problem  Allergic/Immune: [ ] negative [ ] itchy eyes [ ] nasal discharge [ ] hives [ ] angioedema    [ ] All other systems negative  [ ] Unable to assess ROS due to      Physical Exam:  T(F): 97.7 (-17), Max: 98 (-16)  HR: 72 (-) (62 - 83)  BP: 140/70 (-) (120/71 - 166/75)  RR: 17 (-17)  SpO2: 97% (-)  GENERAL: No acute distress, well-developed  HEAD:  Atraumatic, Normocephalic  ENT: EOMI, PERRLA, conjunctiva and sclera clear, Neck supple, No JVD, moist mucosa  CHEST/LUNG: Clear to auscultation bilaterally; No wheeze, equal breath sounds bilaterally   BACK: No spinal tenderness  HEART: Regular rate and rhythm; No murmurs, rubs, or gallops  ABDOMEN: Soft, Nontender, Nondistended; Bowel sounds present  EXTREMITIES:  No clubbing, cyanosis, or edema  PSYCH: Nl behavior, nl affect  NEUROLOGY: AAOx3, non-focal, cranial nerves intact  SKIN: Normal color, No rashes or lesions  LINES:    Cardiovascular Diagnostic Testing:    ECG: Personally reviewed:  Sinus rhythm with poor R wave progression and PVC. No ST segment changes    Echo: Personally reviewed:      Patient name: MERCEDES VALERO  YOB: 1955   Age: 63 (M)   MR#: [id number]  Study Date: 10/10/2018  Location: [facility] stress Sonographer: Fariha Muir  JESUS  Study quality: Technically Difficult  Referring Physician: Matt Mason MD  Blood Pressure: 125/86 mmHg  Height: 178 cm  Weight: 102 kg  BSA: 2.2 m2  ------------------------------------------------------------------------  PROCEDURE: Transthoracic echocardiogram with 2-D, M-Mode  and complete spectral and color flow Doppler.  INDICATION: Angina pectoris, unspecified (I20.9)  ------------------------------------------------------------------------  DIMENSIONS:  Dimensions:     Normal Values:  LA:     3.6 cm    2.0 - 4.0 cm  Ao:     3.0 cm    2.0 - 3.8 cm  SEPTUM: 0.7 cm   0.6 - 1.2 cm  PWT:    0.7 cm    0.6 - 1.1 cm  LVIDd:  5.1 cm    3.0 - 5.6 cm  LVIDs:  3.3 cm    1.8 - 4.0 cm  Derived Variables:  LVMI: 54 g/m2  RWT: 0.27  Fractional short: 35 %  ------------------------------------------------------------------------  OBSERVATIONS:  Mitral Valve: Mitral annular calcification, otherwise  normal mitral valve. Minimal mitral regurgitation.  Aortic Root: Normal aortic root.  Aortic Valve: Aortic valve leaflet morphology not well  visualized.  Left Atrium: Normalleft atrium.  Left Ventricle: Endocardium not well visualized; unable to  evaluate left ventricular systolic function. Normal left  ventricular internal dimensions and wall thicknesses.  Right Heart: Normal right atrium. The right ventricle is  not well visualized; grossly normal right ventricular  systolic function. Normal tricuspid valve. Minimal  tricuspid regurgitation. Normal pulmonic valve.  Pericardium/PleuraNormal pericardium with no pericardial  effusion.  ------------------------------------------------------------------------  CONCLUSIONS:  Technically difficult study.  1. Mitral annular calcification, otherwise normal mitral  valve. Minimal mitral regurgitation.  2. Normal left ventricular internal dimensions and wall  thicknesses.  3. Endocardium not well visualized; unable to evaluate left  ventricular systolic function.  4. The right ventricle is not well visualized; grossly  normal right ventricular systolic function.  Consider limited repeat imaging with IV contrast  administration for improved evaluation of LV systolic  function, if clinically indicated.  ------------------------------------------------------------------------  Confirmed on  10/10/2018 - 15:38:17 by Adrian Garcia M.D.  ------------------------------------------------------------------------      Stress Testing:    ------------------------------------------------------------------------  IMPRESSIONS:Abnormal Study  * Myocardial Perfusion SPECT results are abnormal at 85 %  of MPHR.  * Reviewof raw data shows: The study is of good technical  quality.  * There is a small to medium sized, moderate to severe  defect in apical and inferolateral wall that was partially  reversible, suggestive of infarct with mild to moderate  (mostly apical)jeff-infarct ischemia  * Post-stress gated wall motion analysis was performed  (LVEF = 48 %;LVEDV = 107 ml.), revealing focal  inferolateral akinesis.  RV function appeared normal.  ADDENDUM 10/12/2018: include information from rest imaging  ------------------------------------------------------------------------  Revised on  10/12/2018 - 17:00:44 by Karthikeyan Hodge M.D.  Confirmed on  10/12/2018 - 17:10:26 by Karthikeyan Hodge M.D.  ------------------------------------------------------------------------    Imaging:    CXR: Personally reviewed    ACC: [id number] EXAM: XR CHEST PORTABLE URGENT 1V ORDERED BY: CAMPOS CROWELL    PROCEDURE DATE: 2024        INTERPRETATION: CLINICAL INFORMATION: Dyspnea    Time of Exam: 2024 at 2:20 PM    EXAM: Frontal Chest    FINDINGS:  Both lungs are equally aerated and free of any focal abnormalities.  The heart is not enlarged and there is no effusion or pneumothorax.  The bones show no acute findings.      COMPARISON: 2018        IMPRESSION: Clear lungs    --- End of Report ---    Labs: Personally reviewed                        15.5   7.64  )-----------( 169      ( 2024 05:32 )             43.2     -    138  |  106  |  16  ----------------------------<  114<H>  4.0   |  20<L>  |  0.81    Ca    8.7      2024 05:32  Phos  2.3       Mg     1.80         TPro  7.3  /  Alb  4.2  /  TBili  0.7  /  DBili  x   /  AST  16  /  ALT  26  /  AlkPhos  91  04-16    PT/INR - ( 2024 12:51 )   PT: 13.0 sec;   INR: 1.16 ratio         PTT - ( 2024 12:51 )  PTT:34.5 sec    Total Cholesterol: 120  LDL: --  HDL: 30  T      Thyroid Stimulating Hormone, Serum: 1.42 uIU/mL ( @ 05:32)      CARDIAC MARKERS ( 2024 14:25 )  8 ng/L / x     / x     / x     / x     / x      CARDIAC MARKERS ( 2024 12:51 )  7 ng/L / x     / x     / x     / x     / x          No Known Allergies    acetaminophen     Tablet .. 650 milliGRAM(s) Oral every 6 hours PRN  aluminum hydroxide/magnesium hydroxide/simethicone Suspension 30 milliLiter(s) Oral every 4 hours PRN  atorvastatin 20 milliGRAM(s) Oral at bedtime  dextrose 10% Bolus 125 milliLiter(s) IV Bolus once  dextrose 5%. 1000 milliLiter(s) IV Continuous <Continuous>  dextrose 5%. 1000 milliLiter(s) IV Continuous <Continuous>  dextrose 50% Injectable 25 Gram(s) IV Push once  dextrose 50% Injectable 12.5 Gram(s) IV Push once  dextrose Oral Gel 15 Gram(s) Oral once PRN  glucagon  Injectable 1 milliGRAM(s) IntraMuscular once  influenza  Vaccine (HIGH DOSE) 0.7 milliLiter(s) IntraMuscular once  insulin lispro (ADMELOG) corrective regimen sliding scale   SubCutaneous at bedtime  insulin lispro (ADMELOG) corrective regimen sliding scale   SubCutaneous three times a day before meals  losartan 50 milliGRAM(s) Oral daily  melatonin 3 milliGRAM(s) Oral at bedtime PRN  metoprolol tartrate 25 milliGRAM(s) Oral two times a day  ondansetron Injectable 4 milliGRAM(s) IV Push every 8 hours PRN  pantoprazole    Tablet 40 milliGRAM(s) Oral before breakfast  rivaroxaban 20 milliGRAM(s) Oral with dinner    T(F): 97.7 (), Max: 98 (-)  HR: 72 () (62 - 83)  BP: 140/70 () (120/71 - 166/75)  RR: 17 (-)  SpO2: 97% ()

## 2024-04-17 NOTE — CONSULT NOTE ADULT - ATTENDING COMMENTS
I personally reviewed the echo. Normal biv function.  Outpatient stress test with Dr. Long in [place].  No further inpatient cardiac testing, ok for d/c from my perspective.

## 2024-04-17 NOTE — CONSULT NOTE ADULT - ASSESSMENT
70 y/o male with PMHx of A-fib on xarelto, CAD s/p stents X 3, HTN, HLD, DM and GERD who presented to the ED for chest pain X 1 day. Troponins are negative x2 and EKG non ischemic    #Chest pain  Patient with chest pain that is concerning for angina. EKG does not show any signs of ischemia without ST elevations or T wave inversions. Troponins are negative.   -Please obtain nuclear stress test  -Please obtain HgbA1c, lipid panel, TSH  -Please obtain TTE  -Can stop trending cardiac enzymes  -Continue atorvastatin 20mg    #Afib  -Metoprolol 25mg BID  -Continue xarelto 20mg daily    Tamanna Vance MD  Cardiology fellow 68 y/o male with PMHx of A-fib on xarelto, CAD s/p stents X 3, HTN, HLD, DM and GERD who presented to the ED for chest pain X 1 day. Troponins are negative x2 and EKG non ischemic    #Chest pain  Patient with chest pain that is likely MSK related as he was carrying heavy boxes and does not usually have anginal pain. Patient does have an extensive cardiac history. . EKG does not show any signs of ischemia without ST elevations or T wave inversions. Troponins are negative.   -Patient would rather get nuclear testing as outpatient with his cardiologist. This is reasonable as patient will get close follow up and is now chest pain free.   -Please obtain HgbA1c, lipid panel, TSH  -Please obtain TTE  -Can stop trending cardiac enzymes  -Continue atorvastatin 20mg    #Afib  -Metoprolol 25mg BID  -Continue xarelto 20mg daily    Tamanna Vance MD  Cardiology fellow

## 2024-04-17 NOTE — DISCHARGE NOTE PROVIDER - NSDCMRMEDTOKEN_GEN_ALL_CORE_FT
atorvastatin 20 mg oral tablet: 1 tab(s) orally once a day (at bedtime)  ezetimibe 10 mg oral tablet: 1 tab(s) orally once a day  losartan 50 mg oral tablet: 1 tab(s) orally once a day  metFORMIN 500 mg oral tablet: 1 tab(s) orally once a day  Metoprolol Tartrate 25 mg oral tablet: 1 tab(s) orally 2 times a day  NexIUM 40 mg oral delayed release capsule: 1 cap(s) orally once a day  Xarelto 20 mg oral tablet: 1 tab(s) orally once a day (at bedtime)   atorvastatin 20 mg oral tablet: 1 tab(s) orally once a day (at bedtime)  ezetimibe 10 mg oral tablet: 1 tab(s) orally once a day  losartan 50 mg oral tablet: 1 tab(s) orally once a day  melatonin 3 mg oral tablet: 1 tab(s) orally once a day (at bedtime) As needed Insomnia  metFORMIN 500 mg oral tablet: 1 tab(s) orally once a day  Metoprolol Tartrate 25 mg oral tablet: 1 tab(s) orally 2 times a day  NexIUM 40 mg oral delayed release capsule: 1 cap(s) orally once a day  Xarelto 20 mg oral tablet: 1 tab(s) orally once a day (at bedtime)

## 2024-04-17 NOTE — PROGRESS NOTE ADULT - PROBLEM SELECTOR PLAN 1
Pt reports acute onset non-exertional chest pain last night with radiation to left arm, ongoing until day of admission  EKG non ischemic, trops 7--8  Low c/f ACS however given comorbidities, warrants further workup   On xarelto, c/w metoprolol, atorvastatin  FU TTE ordered to eval for WMA   Monitor on tele  Cards consulted FU recs Pt reports acute onset non-exertional chest pain last night with radiation to left arm, ongoing until day of admission  Suspect MSK. Low c/f ACS  EKG non ischemic, trops 7--8   however given comorbidities, warrants further workup   On xarelto, c/w metoprolol, atorvastatin  FU TTE ordered to eval for WMA   Monitor on tele  Cards consulted, Appreciate recs- Patient would rather get nuclear testing as outpatient with his cardiologist. This is reasonable as patient will get close follow up and is now chest pain free.

## 2024-04-17 NOTE — PROVIDER CONTACT NOTE (OTHER) - SITUATION
Patient refused AM FS. Patient stated, "I don't take insulin at home. I just take Metformin at night. No fingersticks." RN provided education. Patient verbalized understanding and declined.

## 2024-04-17 NOTE — DISCHARGE NOTE NURSING/CASE MANAGEMENT/SOCIAL WORK - NSDCPEFALRISK_GEN_ALL_CORE
For information on Fall & Injury Prevention, visit: [email or web address] OR  [email or web address] OR  https://www.cdc.gov/steadi/patient.html

## 2024-04-17 NOTE — DISCHARGE NOTE PROVIDER - NSDCCPCAREPLAN_GEN_ALL_CORE_FT
PRINCIPAL DISCHARGE DIAGNOSIS  Diagnosis: Chest pain  Assessment and Plan of Treatment: You were admitted for chest pain which was likely a muscle strain fromlifting heavy boxes. Your cardiac enzymes were negative. you were seen by cardiolgy and you would rather  follow up with your cardiologist as outpt. Please continue taking your medications,return to the hospital if you have  chest pain or breathing porblems or other acute symptoms

## 2024-04-17 NOTE — PROGRESS NOTE ADULT - PROBLEM SELECTOR PLAN 5
dvt ppx: on xarelto dvt ppx: on xarelto  Dispo- FU TTE, DC planning pending TTE   DC Planning 33mins

## 2024-04-17 NOTE — PROVIDER CONTACT NOTE (OTHER) - ASSESSMENT
Patient refused AM FS. Patient stated, "I don't take insulin at home. I just take Metformin at night." RN provided education. Patient verbalized understanding and declined. ACP ZAYDA tony.
Pt A&OX4. Pt Pt stated his A1C is WDL and does not require finger sticks at home. Pt stated he is not eating and does not want Xarelto.

## 2024-04-17 NOTE — DISCHARGE NOTE PROVIDER - NSDCFUSCHEDAPPT_GEN_ALL_CORE_FT
Duong Haji  [employer] Physician [facility]  GASTRO [address]  Scheduled Appointment: 04/22/2024    Сергей Long  [employer] Physician [facility]  CARDIOLOGY [address]  Scheduled Appointment: 05/17/2024

## 2024-04-18 LAB
HCV AB S/CO SERPL IA: 0.09 S/CO — SIGNIFICANT CHANGE UP (ref 0–0.99)
HCV AB SERPL-IMP: SIGNIFICANT CHANGE UP

## 2024-04-22 ENCOUNTER — APPOINTMENT (OUTPATIENT)
Dept: GASTROENTEROLOGY | Facility: CLINIC | Age: 69
End: 2024-04-22
Payer: MEDICARE

## 2024-04-22 VITALS
HEART RATE: 78 BPM | HEIGHT: 70 IN | OXYGEN SATURATION: 98 % | SYSTOLIC BLOOD PRESSURE: 130 MMHG | DIASTOLIC BLOOD PRESSURE: 70 MMHG | WEIGHT: 225 LBS | BODY MASS INDEX: 32.21 KG/M2

## 2024-04-22 DIAGNOSIS — K21.9 GASTRO-ESOPHAGEAL REFLUX DISEASE W/OUT ESOPHAGITIS: ICD-10-CM

## 2024-04-22 DIAGNOSIS — K57.30 DIVERTICULOSIS OF LARGE INTESTINE W/OUT PERFORATION OR ABSCESS W/OUT BLEEDING: ICD-10-CM

## 2024-04-22 DIAGNOSIS — Z12.11 ENCOUNTER FOR SCREENING FOR MALIGNANT NEOPLASM OF COLON: ICD-10-CM

## 2024-04-22 PROCEDURE — 99204 OFFICE O/P NEW MOD 45 MIN: CPT

## 2024-04-22 RX ORDER — PEG-3350, SODIUM SULFATE, SODIUM CHLORIDE, POTASSIUM CHLORIDE, SODIUM ASCORBATE AND ASCORBIC ACID 7.5-2.691G
100 KIT ORAL
Qty: 1 | Refills: 0 | Status: ACTIVE | COMMUNITY
Start: 2024-04-22 | End: 1900-01-01

## 2024-04-22 NOTE — HISTORY OF PRESENT ILLNESS
[FreeTextEntry1] : Office consultation on 4/22/2024.  The patient is a 69-year-old man evaluated for consultation in preparation for surveillance colonoscopy and evaluation of GERD.  PMD: Dr. Сергей Long.    CONSULTATION 9/5/18:     -Patient has long-standing history of chronic GERD symptoms. Reports increased severity of reflux symptoms. Complains of daily episodes of heartburn described as retrosternal burning. Approximately every 2-3 weeks he experiences nocturnal awakening with feeling of acid regurgitation, sour brash and cough.                                             -Appetite is stable. Denies weight change. No dysphagia except occasionally feels sense of slow bolus passage after solid food such as bread or steak. Experiences occasional mild intermittent lower abdominal discomfort. Has daily bowel movements without complaints of diarrhea, constipation, change in bowel habit or rectal bleeding.                                              -Previously reports that brand name Nexium 40 mg daily resulted in an excellent symptomatic control. OTC Nexium 20 mg was less effective. Various generic PPIs have been ineffective. Currently on pantoprazole 40 mg daily with frequent breakthrough reflux symptoms.                                              -The patient underwent a screening colonoscopy on 3/12/15.  A 4 mm diminutive mid ascending colon sessile serrated polyp, 4 mm hepatic flexure adenoma, 4 mm mid transverse colon adenoma and a 4 mm transverse colon hyperplastic polyp were all removed. Sigmoid diverticulosis was noted.                                               -In addition, an upper endoscopy was performed to evaluate GERD symptoms. Examination of the esophagus revealed a small 1-2 cm hiatus hernia. The mucosa was normal except for a single erosion at the GE junction. There was no Kenny's esophagus. The stomach and visualized duodenum were normal. A GE junction biopsy revealed acute and chronic inflammation. Testing was negative for intestinal metaplasia. Features of GERD were noted. A gastric biopsy revealed chronic inactive gastritis. Testing was negative for Helicobacter pylori.                                               -Patient has history of obesity. Patient reports no other history of digestive illness. Family history of colon cancer not reported.  COLONOSCOPY 2/6/2020:     -Surveillance colonoscopy was performed on 2/6/2020. Total colonoscopy was performed to the cecum with ileoscopy. Normal terminal ileum. A 4 mm diminutive proximal ascending colon adenoma and a 5 mm sessile Richelle 1s mid ascending colon adenoma were removed. Marked sigmoid diverticulosis was noted with associated muscular hypertrophy, luminal narrowing and fixation. Hemorrhoids were detected. A followup surveillance colonoscopy is advised in 5 years.  EGD 2/6/2020:      -EGD was performed to evaluate GERD. Examination of the esophagus was normal except for a small 1-2 cm hiatus hernia (Hill grade 2). The esophagus was otherwise normal without erosive esophagitis or Kenny's esophagus. Examination of the stomach was normal. Gastric antrum biopsy revealed chronic inactive gastritis. Helicobacter pylori was not detected. Gastric intestinal metaplasia was not detected. The visualized duodenum to the second portion was normal.  CONSULTATION 4/22/2024:     -Evaluated for consultation in preparation for surveillance colonoscopy and evaluation of GERD.  Has 1 formed bowel movement daily without any complaints of diarrhea, constipation, change in bowel habit or rectal bleeding.  Appetite and weight are stable.  Reports no complaints of dysphagia, nausea, vomiting or abdominal pain.  Currently on a regimen of omeprazole 40 mg daily for treatment of GERD.  Occasionally experiences breakthrough heartburn mostly after culprit foods or other triggers such as alcohol, fried food or spicy food.  Will utilize Tums as needed.                                                    -PMH:                                                    -- Past diseases: Hypertension, diabetes, hypercholesterolemia, atrial fibrillation, CAD status post coronary stent insertion x 3 (2019), GERD, pneumonia, colonic adenoma, sigmoid diverticulosis.                                                    -- Operations: None.                                                    -- Allergies: None.                                                    -- Family history: FH for colon cancer not reported.  Sister had lung cancer.                                                    -- Social history: Tobacco-none.  Alcohol-none.  .  Has 2 daughters.  Retired.                                                    -- Medications: Atorvastatin 20 mg, losartan 50 mg, Zetia 10 mg, omeprazole 40 mg, metformin 500 mg, Xarelto 20 mg, metoprolol 25 mg twice daily.

## 2024-04-22 NOTE — ADDENDUM
[FreeTextEntry1] : Plan:  #1 the patient will be scheduled for a surveillance colonoscopy. Prior colonoscopy and EGD reports of 2/2020 reviewed.  Advised surveillance colonoscopy 5 years from previous assessment tentatively planned for 2/2025.  I had an extensive discussion with the patient including risks, benefits and alternatives possibly including bleeding, perforation, inadvertent injury to contiguous organs including spleen, acute colitis, need for blood transfusion or surgery, infection, and medication and anesthetic risk. The limitations of colonoscopy were discussed including missed polyps and cancer.  Possible medication and anesthesia related adverse cardiovascular and respiratory reactions were reviewed.  The patient wishes to proceed and will be scheduled for a surveillance colonoscopy.  The rationale of colonoscopy was discussed not only in terms of the early detection of colon cancer, but also as a means of prevention in the event of removal of a polyp.  The limitations of colonoscopy were discussed, and the patient is aware that not every cancer is prevented.  The patient will utilize the [default value] colonoscopy preparation in split fashion and the administration of this product was discussed.  Patient counseled if cleared to hold Xarelto for 2 full days before colonoscopy examination.  Upper endoscopy will be performed at same session in view of chronic GERD symptoms.  Patient will be evaluated at PAST in view of cardiac comorbidity.  #2 antireflux precautions.  #3 omeprazole 40 mg p.o. daily 30 minutes AC.

## 2024-04-22 NOTE — ASSESSMENT
[FreeTextEntry1] : Impression:  #1 history of colonic polyps-status post removal of a 4 mm diminutive mid ascending colon sessile serrated polyp, 4 mm hepatic flexure adenoma, 4 mm mid transverse colon adenoma and a 4 mm transverse colon hyperplastic polyp at the 3/12/15 colonoscopy.  Colonoscopy 2/6/2020 was noted for removal of a 4 mm diminutive proximal ascending colon adenoma and a 5 mm sessile Richelle 1s mid ascending colon adenoma.  Rule out metachronous lesions.  #2 GERD-episodic heartburn described as typical retrosternal burning.  Overall, favorable treatment response to current regimen of omeprazole 40 mg daily.  EGD on 2/6/2020 was noted for small 1 to 2 cm hiatus hernia (Hill grade 2).  Erosive esophagitis or Kenny's esophagus not detected.  #3 sigmoid diverticulosis-colonoscopy 2/6/2020 was noted for marked sigmoid diverticulosis with associated muscular hypertrophy, luminal narrowing and fixation.  #4 obesity-BMI 32.28.  General medical problems:  # hypertension.  # diabetes mellitus.  # CAD status post coronary stent insertion x 3 (2019).  # atrial fibrillation.  # hypercholesterolemia.  # history pneumonia

## 2024-04-22 NOTE — REVIEW OF SYSTEMS
[Shortness Of Breath] : shortness of breath [As Noted in HPI] : as noted in HPI [Negative] : Heme/Lymph [FreeTextEntry9] : Back pain.

## 2024-04-22 NOTE — CONSULT LETTER
[Dear  ___] : Dear  [unfilled], [Consult Letter:] : I had the pleasure of evaluating your patient, [unfilled]. [( Thank you for referring [unfilled] for consultation for _____ )] : Thank you for referring [unfilled] for consultation for [unfilled] [Please see my note below.] : Please see my note below. [Consult Closing:] : Thank you very much for allowing me to participate in the care of this patient.  If you have any questions, please do not hesitate to contact me. [Sincerely,] : Sincerely, [FreeTextEntry2] : Dr. Сергей Long [FreeTextEntry3] : Duong Haji M.D.

## 2024-04-25 ENCOUNTER — APPOINTMENT (OUTPATIENT)
Dept: CARDIOLOGY | Facility: CLINIC | Age: 69
End: 2024-04-25
Payer: MEDICARE

## 2024-04-25 VITALS
SYSTOLIC BLOOD PRESSURE: 142 MMHG | OXYGEN SATURATION: 98 % | HEART RATE: 74 BPM | BODY MASS INDEX: 32.35 KG/M2 | DIASTOLIC BLOOD PRESSURE: 70 MMHG | HEIGHT: 70 IN | WEIGHT: 226 LBS

## 2024-04-25 VITALS — SYSTOLIC BLOOD PRESSURE: 134 MMHG | DIASTOLIC BLOOD PRESSURE: 70 MMHG

## 2024-04-25 DIAGNOSIS — I25.10 ATHEROSCLEROTIC HEART DISEASE OF NATIVE CORONARY ARTERY W/OUT ANGINA PECTORIS: ICD-10-CM

## 2024-04-25 DIAGNOSIS — R07.9 CHEST PAIN, UNSPECIFIED: ICD-10-CM

## 2024-04-25 DIAGNOSIS — R09.89 OTHER SPECIFIED SYMPTOMS AND SIGNS INVOLVING THE CIRCULATORY AND RESPIRATORY SYSTEMS: ICD-10-CM

## 2024-04-25 DIAGNOSIS — I48.0 PAROXYSMAL ATRIAL FIBRILLATION: ICD-10-CM

## 2024-04-25 PROCEDURE — G2211 COMPLEX E/M VISIT ADD ON: CPT

## 2024-04-25 PROCEDURE — 99214 OFFICE O/P EST MOD 30 MIN: CPT

## 2024-04-25 NOTE — DISCUSSION/SUMMARY
[FreeTextEntry1] : Exercise stress echo to eval for evid of myocardial ischemia, advised hold 2 doses of metoprolol before stress test. Recommended contin current meds. If chest pain recurs advised call or go to ER. Reassurance.

## 2024-04-25 NOTE — HISTORY OF PRESENT ILLNESS
[FreeTextEntry1] : Status post 2 day hosp at [place] for chest pain.  He also has mult other symptoms - fatigue, dizziness, labile BP. BP as low as DBP 49 in hospital and 60 at home. Trop neg, echo unremarkable.  Discharged home with rec to follow up for stress test. he saw GI and was rx'd PPI. Chest pain resolved.  + intermittent shortness of breath.  + intermittent palpitations. Hosp records reviewed.

## 2024-05-29 ENCOUNTER — APPOINTMENT (OUTPATIENT)
Dept: CARDIOLOGY | Facility: CLINIC | Age: 69
End: 2024-05-29

## 2024-05-30 ENCOUNTER — RX RENEWAL (OUTPATIENT)
Age: 69
End: 2024-05-30

## 2024-05-30 RX ORDER — OMEPRAZOLE 40 MG/1
40 CAPSULE, DELAYED RELEASE ORAL
Qty: 90 | Refills: 2 | Status: ACTIVE | COMMUNITY
Start: 2021-06-29 | End: 1900-01-01

## 2024-06-12 ENCOUNTER — RX RENEWAL (OUTPATIENT)
Age: 69
End: 2024-06-12

## 2024-06-12 RX ORDER — METFORMIN HYDROCHLORIDE 500 MG/1
500 TABLET, COATED ORAL DAILY
Qty: 90 | Refills: 3 | Status: ACTIVE | COMMUNITY
Start: 2023-09-21 | End: 1900-01-01

## 2024-06-14 RX ORDER — RIVAROXABAN 20 MG/1
20 TABLET, FILM COATED ORAL
Qty: 90 | Refills: 2 | Status: ACTIVE | COMMUNITY
Start: 2023-04-17 | End: 1900-01-01

## 2024-07-05 ENCOUNTER — RX RENEWAL (OUTPATIENT)
Age: 69
End: 2024-07-05

## 2024-07-17 ENCOUNTER — RX RENEWAL (OUTPATIENT)
Age: 69
End: 2024-07-17

## 2024-07-17 RX ORDER — LOSARTAN POTASSIUM 25 MG/1
25 TABLET, FILM COATED ORAL
Qty: 90 | Refills: 3 | Status: ACTIVE | COMMUNITY
Start: 2024-07-17 | End: 1900-01-01

## 2024-08-12 ENCOUNTER — RX RENEWAL (OUTPATIENT)
Age: 69
End: 2024-08-12

## 2024-09-12 ENCOUNTER — RX RENEWAL (OUTPATIENT)
Age: 69
End: 2024-09-12

## 2024-11-15 ENCOUNTER — NON-APPOINTMENT (OUTPATIENT)
Age: 69
End: 2024-11-15

## 2024-11-15 ENCOUNTER — APPOINTMENT (OUTPATIENT)
Dept: CARDIOLOGY | Facility: CLINIC | Age: 69
End: 2024-11-15
Payer: MEDICARE

## 2024-11-15 VITALS
DIASTOLIC BLOOD PRESSURE: 64 MMHG | BODY MASS INDEX: 31.92 KG/M2 | WEIGHT: 223 LBS | HEART RATE: 69 BPM | HEIGHT: 70 IN | OXYGEN SATURATION: 96 % | SYSTOLIC BLOOD PRESSURE: 138 MMHG

## 2024-11-15 VITALS — SYSTOLIC BLOOD PRESSURE: 124 MMHG | DIASTOLIC BLOOD PRESSURE: 64 MMHG

## 2024-11-15 DIAGNOSIS — I25.10 ATHEROSCLEROTIC HEART DISEASE OF NATIVE CORONARY ARTERY W/OUT ANGINA PECTORIS: ICD-10-CM

## 2024-11-15 DIAGNOSIS — G47.00 INSOMNIA, UNSPECIFIED: ICD-10-CM

## 2024-11-15 DIAGNOSIS — R07.9 CHEST PAIN, UNSPECIFIED: ICD-10-CM

## 2024-11-15 DIAGNOSIS — I10 ESSENTIAL (PRIMARY) HYPERTENSION: ICD-10-CM

## 2024-11-15 DIAGNOSIS — R53.83 OTHER FATIGUE: ICD-10-CM

## 2024-11-15 PROCEDURE — G2211 COMPLEX E/M VISIT ADD ON: CPT

## 2024-11-15 PROCEDURE — 93000 ELECTROCARDIOGRAM COMPLETE: CPT

## 2024-11-15 PROCEDURE — 99214 OFFICE O/P EST MOD 30 MIN: CPT

## 2024-11-15 PROCEDURE — 36415 COLL VENOUS BLD VENIPUNCTURE: CPT

## 2024-11-15 RX ORDER — ZOLPIDEM TARTRATE 5 MG/1
5 TABLET ORAL
Qty: 15 | Refills: 0 | Status: ACTIVE | COMMUNITY
Start: 2024-11-15 | End: 1900-01-01

## 2024-11-18 ENCOUNTER — TRANSCRIPTION ENCOUNTER (OUTPATIENT)
Age: 69
End: 2024-11-18

## 2024-11-18 LAB
ALBUMIN SERPL ELPH-MCNC: 4.6 G/DL
ALP BLD-CCNC: 106 U/L
ALT SERPL-CCNC: 29 U/L
ANION GAP SERPL CALC-SCNC: 16 MMOL/L
AST SERPL-CCNC: 23 U/L
BILIRUB SERPL-MCNC: 1.2 MG/DL
BUN SERPL-MCNC: 17 MG/DL
CALCIUM SERPL-MCNC: 10 MG/DL
CHLORIDE SERPL-SCNC: 103 MMOL/L
CHOLEST SERPL-MCNC: 108 MG/DL
CO2 SERPL-SCNC: 22 MMOL/L
CREAT SERPL-MCNC: 0.92 MG/DL
EGFR: 89 ML/MIN/1.73M2
ESTIMATED AVERAGE GLUCOSE: 131 MG/DL
GLUCOSE SERPL-MCNC: 138 MG/DL
HBA1C MFR BLD HPLC: 6.2 %
HCT VFR BLD CALC: 45.9 %
HDLC SERPL-MCNC: 36 MG/DL
HGB BLD-MCNC: 15.8 G/DL
LDLC SERPL CALC-MCNC: 55 MG/DL
MCHC RBC-ENTMCNC: 30.9 PG
MCHC RBC-ENTMCNC: 34.4 G/DL
MCV RBC AUTO: 89.8 FL
NONHDLC SERPL-MCNC: 72 MG/DL
PLATELET # BLD AUTO: 214 K/UL
POTASSIUM SERPL-SCNC: 5.1 MMOL/L
PROT SERPL-MCNC: 7.5 G/DL
RBC # BLD: 5.11 M/UL
RBC # FLD: 13 %
SODIUM SERPL-SCNC: 141 MMOL/L
TESTOST SERPL-MCNC: 459 NG/DL
TRIGL SERPL-MCNC: 87 MG/DL
TSH SERPL-ACNC: 0.81 UIU/ML
WBC # FLD AUTO: 7.6 K/UL

## 2024-11-22 ENCOUNTER — OUTPATIENT (OUTPATIENT)
Dept: OUTPATIENT SERVICES | Facility: HOSPITAL | Age: 69
LOS: 1 days | End: 2024-11-22

## 2024-11-22 VITALS
HEART RATE: 59 BPM | HEIGHT: 69 IN | TEMPERATURE: 97 F | WEIGHT: 223.99 LBS | RESPIRATION RATE: 16 BRPM | OXYGEN SATURATION: 96 % | SYSTOLIC BLOOD PRESSURE: 140 MMHG | DIASTOLIC BLOOD PRESSURE: 76 MMHG

## 2024-11-22 DIAGNOSIS — Z98.890 OTHER SPECIFIED POSTPROCEDURAL STATES: Chronic | ICD-10-CM

## 2024-11-22 DIAGNOSIS — I10 ESSENTIAL (PRIMARY) HYPERTENSION: ICD-10-CM

## 2024-11-22 DIAGNOSIS — K21.9 GASTRO-ESOPHAGEAL REFLUX DISEASE WITHOUT ESOPHAGITIS: ICD-10-CM

## 2024-11-22 DIAGNOSIS — Z95.5 PRESENCE OF CORONARY ANGIOPLASTY IMPLANT AND GRAFT: Chronic | ICD-10-CM

## 2024-11-22 DIAGNOSIS — I48.0 PAROXYSMAL ATRIAL FIBRILLATION: ICD-10-CM

## 2024-11-22 DIAGNOSIS — E11.9 TYPE 2 DIABETES MELLITUS WITHOUT COMPLICATIONS: ICD-10-CM

## 2024-11-22 DIAGNOSIS — Z98.89 OTHER SPECIFIED POSTPROCEDURAL STATES: Chronic | ICD-10-CM

## 2024-11-22 DIAGNOSIS — Z91.89 OTHER SPECIFIED PERSONAL RISK FACTORS, NOT ELSEWHERE CLASSIFIED: ICD-10-CM

## 2024-11-22 DIAGNOSIS — Z12.11 ENCOUNTER FOR SCREENING FOR MALIGNANT NEOPLASM OF COLON: ICD-10-CM

## 2024-11-22 RX ORDER — 0.9 % SODIUM CHLORIDE 0.9 %
1000 INTRAVENOUS SOLUTION INTRAVENOUS
Refills: 0 | Status: DISCONTINUED | OUTPATIENT
Start: 2024-12-03 | End: 2024-12-17

## 2024-11-22 NOTE — H&P PST ADULT - NSICDXPASTMEDICALHX_GEN_ALL_CORE_FT
PAST MEDICAL HISTORY:  Atypical chest pain     DM2 (diabetes mellitus, type 2)     GERD (gastroesophageal reflux disease)     HLD (hyperlipidemia)     HTN (hypertension)     NSVT (nonsustained ventricular tachycardia)     PAF (paroxysmal atrial fibrillation)

## 2024-11-22 NOTE — H&P PST ADULT - HISTORY OF PRESENT ILLNESS
68 y/o male PMH PAF, NSVT xarelto, CAD s/p stents X 3 (2018), atypical chest pain, HTN, HLD, DM type 2, presents to presurgical testing with diagnosis of GERD and encounter for screening for malignant neoplasm of colon. Pt with GERD symptoms, last colonoscopy and endoscopy in 2020, revealing diverticulosis. Pt is scheduled for endoscopy and colonoscopy.

## 2024-11-22 NOTE — H&P PST ADULT - LAST ECHOCARDIOGRAM
4/2024 ([facility]) Left ventricular cavity is normal in size. Left ventricular wall thickness is normal. Left ventricular systolic function is normal with an ejection fraction of 69 %

## 2024-11-22 NOTE — H&P PST ADULT - LAST STRESS TEST
2023 (allscripts) the left ventricle is normal in size, there is a small severe defect in apical and distal inferolateral wall that is fixed consistent with infarct

## 2024-11-22 NOTE — H&P PST ADULT - CARDIOVASCULAR COMMENTS
PAF on xarelto, HTN HLD CAD s/p 3 cardiac stents 2018, not on ASA, followed by cardiology, h/o intermittent atypical chest pain, had ED visit in 4/2024 [facility], denies symptoms currently

## 2024-11-22 NOTE — H&P PST ADULT - NSICDXPASTSURGICALHX_GEN_ALL_CORE_FT
PAST SURGICAL HISTORY:  H/O colonoscopy     H/O endoscopy     History of hand surgery     Stented coronary artery

## 2024-11-22 NOTE — H&P PST ADULT - PROBLEM SELECTOR PLAN 1
Patient tentatively scheduled for endoscopy and colonoscopy for 12/3/24. Pre-op instructions provided. Pt given verbal and written instructions with teach back. Pt verbalized understanding with return demonstration.       Cardiac evaluation (11/15/24) in [facility]    "He has more chronic atypical left parasternal chest pain that is intermittent.  No cardiac contraindications to colonoscopy which is scheduled"    Labs reviewed 11/15/24 in [facility] - CBC BMP A1C (6.2)

## 2024-12-03 ENCOUNTER — APPOINTMENT (OUTPATIENT)
Dept: GASTROENTEROLOGY | Facility: HOSPITAL | Age: 69
End: 2024-12-03

## 2024-12-03 ENCOUNTER — OUTPATIENT (OUTPATIENT)
Dept: OUTPATIENT SERVICES | Facility: HOSPITAL | Age: 69
LOS: 1 days | Discharge: HOME | End: 2024-12-03
Payer: MEDICARE

## 2024-12-03 ENCOUNTER — RESULT REVIEW (OUTPATIENT)
Age: 69
End: 2024-12-03

## 2024-12-03 VITALS
HEIGHT: 70 IN | TEMPERATURE: 98 F | SYSTOLIC BLOOD PRESSURE: 151 MMHG | HEART RATE: 88 BPM | RESPIRATION RATE: 16 BRPM | WEIGHT: 220.02 LBS | DIASTOLIC BLOOD PRESSURE: 86 MMHG | OXYGEN SATURATION: 98 %

## 2024-12-03 VITALS
SYSTOLIC BLOOD PRESSURE: 134 MMHG | OXYGEN SATURATION: 95 % | RESPIRATION RATE: 14 BRPM | HEART RATE: 72 BPM | DIASTOLIC BLOOD PRESSURE: 76 MMHG

## 2024-12-03 DIAGNOSIS — K21.9 GASTRO-ESOPHAGEAL REFLUX DISEASE WITHOUT ESOPHAGITIS: ICD-10-CM

## 2024-12-03 DIAGNOSIS — Z98.890 OTHER SPECIFIED POSTPROCEDURAL STATES: Chronic | ICD-10-CM

## 2024-12-03 DIAGNOSIS — Z95.5 PRESENCE OF CORONARY ANGIOPLASTY IMPLANT AND GRAFT: Chronic | ICD-10-CM

## 2024-12-03 DIAGNOSIS — Z98.89 OTHER SPECIFIED POSTPROCEDURAL STATES: Chronic | ICD-10-CM

## 2024-12-03 LAB
GLUCOSE BLDC GLUCOMTR-MCNC: 125 MG/DL — HIGH (ref 70–99)
GLUCOSE BLDC GLUCOMTR-MCNC: 135 MG/DL — HIGH (ref 70–99)

## 2024-12-03 PROCEDURE — 88305 TISSUE EXAM BY PATHOLOGIST: CPT | Mod: 26

## 2024-12-03 PROCEDURE — 45380 COLONOSCOPY AND BIOPSY: CPT | Mod: 59

## 2024-12-03 PROCEDURE — 43239 EGD BIOPSY SINGLE/MULTIPLE: CPT

## 2024-12-03 PROCEDURE — 45385 COLONOSCOPY W/LESION REMOVAL: CPT

## 2024-12-03 RX ORDER — OMEPRAZOLE 20 MG/1
40 CAPSULE, DELAYED RELEASE ORAL
Refills: 0 | DISCHARGE

## 2024-12-03 RX ORDER — LOSARTAN POTASSIUM 100 MG/1
1 TABLET, FILM COATED ORAL
Refills: 0 | DISCHARGE

## 2024-12-03 RX ORDER — 0.9 % SODIUM CHLORIDE 0.9 %
1000 INTRAVENOUS SOLUTION INTRAVENOUS
Refills: 0 | Status: DISCONTINUED | OUTPATIENT
Start: 2024-12-03 | End: 2024-12-17

## 2024-12-09 LAB — SURGICAL PATHOLOGY STUDY: SIGNIFICANT CHANGE UP

## 2024-12-10 DIAGNOSIS — Z12.11 ENCOUNTER FOR SCREENING FOR MALIGNANT NEOPLASM OF COLON: ICD-10-CM

## 2024-12-19 ENCOUNTER — APPOINTMENT (OUTPATIENT)
Dept: CARDIOLOGY | Facility: CLINIC | Age: 69
End: 2024-12-19
Payer: MEDICARE

## 2024-12-19 PROCEDURE — 93015 CV STRESS TEST SUPVJ I&R: CPT

## 2024-12-19 PROCEDURE — 78452 HT MUSCLE IMAGE SPECT MULT: CPT

## 2024-12-19 PROCEDURE — A9500: CPT

## 2025-02-03 ENCOUNTER — RX RENEWAL (OUTPATIENT)
Age: 70
End: 2025-02-03

## 2025-02-05 ENCOUNTER — RX RENEWAL (OUTPATIENT)
Age: 70
End: 2025-02-05

## 2025-02-19 ENCOUNTER — NON-APPOINTMENT (OUTPATIENT)
Age: 70
End: 2025-02-19

## 2025-02-19 ENCOUNTER — APPOINTMENT (OUTPATIENT)
Dept: CARDIOLOGY | Facility: CLINIC | Age: 70
End: 2025-02-19
Payer: MEDICARE

## 2025-02-19 VITALS
DIASTOLIC BLOOD PRESSURE: 70 MMHG | SYSTOLIC BLOOD PRESSURE: 140 MMHG | WEIGHT: 227 LBS | OXYGEN SATURATION: 97 % | BODY MASS INDEX: 32.5 KG/M2 | HEART RATE: 80 BPM | HEIGHT: 70 IN

## 2025-02-19 DIAGNOSIS — I48.0 PAROXYSMAL ATRIAL FIBRILLATION: ICD-10-CM

## 2025-02-19 DIAGNOSIS — R00.2 PALPITATIONS: ICD-10-CM

## 2025-02-19 DIAGNOSIS — I49.3 VENTRICULAR PREMATURE DEPOLARIZATION: ICD-10-CM

## 2025-02-19 PROBLEM — E11.9 TYPE 2 DIABETES MELLITUS WITHOUT COMPLICATIONS: Chronic | Status: ACTIVE | Noted: 2024-11-22

## 2025-02-19 PROBLEM — R07.89 OTHER CHEST PAIN: Chronic | Status: ACTIVE | Noted: 2024-11-22

## 2025-02-19 PROBLEM — I47.29 OTHER VENTRICULAR TACHYCARDIA: Chronic | Status: ACTIVE | Noted: 2024-11-22

## 2025-02-19 PROCEDURE — 93000 ELECTROCARDIOGRAM COMPLETE: CPT

## 2025-02-19 PROCEDURE — 99213 OFFICE O/P EST LOW 20 MIN: CPT

## 2025-02-19 PROCEDURE — G2211 COMPLEX E/M VISIT ADD ON: CPT

## 2025-05-05 ENCOUNTER — RX RENEWAL (OUTPATIENT)
Age: 70
End: 2025-05-05

## 2025-06-04 ENCOUNTER — APPOINTMENT (OUTPATIENT)
Dept: CARDIOLOGY | Facility: CLINIC | Age: 70
End: 2025-06-04
Payer: MEDICARE

## 2025-06-04 VITALS
HEART RATE: 56 BPM | WEIGHT: 220 LBS | BODY MASS INDEX: 31.5 KG/M2 | OXYGEN SATURATION: 98 % | DIASTOLIC BLOOD PRESSURE: 78 MMHG | HEIGHT: 70 IN | SYSTOLIC BLOOD PRESSURE: 140 MMHG

## 2025-06-04 DIAGNOSIS — I25.10 ATHEROSCLEROTIC HEART DISEASE OF NATIVE CORONARY ARTERY W/OUT ANGINA PECTORIS: ICD-10-CM

## 2025-06-04 DIAGNOSIS — R09.89 OTHER SPECIFIED SYMPTOMS AND SIGNS INVOLVING THE CIRCULATORY AND RESPIRATORY SYSTEMS: ICD-10-CM

## 2025-06-04 DIAGNOSIS — E11.9 TYPE 2 DIABETES MELLITUS W/OUT COMPLICATIONS: ICD-10-CM

## 2025-06-04 DIAGNOSIS — R53.83 OTHER FATIGUE: ICD-10-CM

## 2025-06-04 DIAGNOSIS — I48.0 PAROXYSMAL ATRIAL FIBRILLATION: ICD-10-CM

## 2025-06-04 DIAGNOSIS — E78.00 PURE HYPERCHOLESTEROLEMIA, UNSPECIFIED: ICD-10-CM

## 2025-06-04 PROCEDURE — 99214 OFFICE O/P EST MOD 30 MIN: CPT

## 2025-06-04 PROCEDURE — 36415 COLL VENOUS BLD VENIPUNCTURE: CPT

## 2025-06-04 PROCEDURE — G2211 COMPLEX E/M VISIT ADD ON: CPT

## 2025-06-06 ENCOUNTER — TRANSCRIPTION ENCOUNTER (OUTPATIENT)
Age: 70
End: 2025-06-06

## 2025-06-06 LAB
ALBUMIN SERPL ELPH-MCNC: 4.4 G/DL
ALP BLD-CCNC: 112 U/L
ALT SERPL-CCNC: 28 U/L
ANION GAP SERPL CALC-SCNC: 17 MMOL/L
AST SERPL-CCNC: 25 U/L
BILIRUB SERPL-MCNC: 1.1 MG/DL
BUN SERPL-MCNC: 16 MG/DL
CALCIUM SERPL-MCNC: 9.9 MG/DL
CHLORIDE SERPL-SCNC: 101 MMOL/L
CHOLEST SERPL-MCNC: 99 MG/DL
CO2 SERPL-SCNC: 21 MMOL/L
CREAT SERPL-MCNC: 0.95 MG/DL
EGFRCR SERPLBLD CKD-EPI 2021: 86 ML/MIN/1.73M2
ESTIMATED AVERAGE GLUCOSE: 137 MG/DL
GLUCOSE SERPL-MCNC: 132 MG/DL
HBA1C MFR BLD HPLC: 6.4 %
HCT VFR BLD CALC: 47.6 %
HDLC SERPL-MCNC: 36 MG/DL
HGB BLD-MCNC: 16.1 G/DL
LDLC SERPL-MCNC: 47 MG/DL
MCHC RBC-ENTMCNC: 30.8 PG
MCHC RBC-ENTMCNC: 33.8 G/DL
MCV RBC AUTO: 91.2 FL
NONHDLC SERPL-MCNC: 63 MG/DL
PLATELET # BLD AUTO: 219 K/UL
POTASSIUM SERPL-SCNC: 4.5 MMOL/L
PROT SERPL-MCNC: 7.4 G/DL
PSA SERPL-MCNC: 1.84 NG/ML
RBC # BLD: 5.22 M/UL
RBC # FLD: 13.2 %
SODIUM SERPL-SCNC: 139 MMOL/L
TRIGL SERPL-MCNC: 79 MG/DL
TSH SERPL-ACNC: 0.8 UIU/ML
WBC # FLD AUTO: 7.35 K/UL

## 2025-08-05 ENCOUNTER — RX RENEWAL (OUTPATIENT)
Age: 70
End: 2025-08-05

## 2025-09-02 ENCOUNTER — APPOINTMENT (OUTPATIENT)
Dept: CARDIOLOGY | Facility: CLINIC | Age: 70
End: 2025-09-02
Payer: MEDICARE

## 2025-09-02 VITALS
WEIGHT: 218 LBS | HEART RATE: 70 BPM | DIASTOLIC BLOOD PRESSURE: 78 MMHG | BODY MASS INDEX: 31.28 KG/M2 | OXYGEN SATURATION: 97 % | SYSTOLIC BLOOD PRESSURE: 148 MMHG

## 2025-09-02 DIAGNOSIS — I10 ESSENTIAL (PRIMARY) HYPERTENSION: ICD-10-CM

## 2025-09-02 DIAGNOSIS — R07.9 CHEST PAIN, UNSPECIFIED: ICD-10-CM

## 2025-09-02 PROCEDURE — 93000 ELECTROCARDIOGRAM COMPLETE: CPT

## 2025-09-02 PROCEDURE — 99214 OFFICE O/P EST MOD 30 MIN: CPT

## 2025-09-02 PROCEDURE — G2211 COMPLEX E/M VISIT ADD ON: CPT

## 2025-09-03 ENCOUNTER — NON-APPOINTMENT (OUTPATIENT)
Age: 70
End: 2025-09-03

## 2025-09-05 ENCOUNTER — OUTPATIENT (OUTPATIENT)
Dept: OUTPATIENT SERVICES | Facility: HOSPITAL | Age: 70
LOS: 1 days | End: 2025-09-05
Payer: MEDICARE

## 2025-09-05 ENCOUNTER — TRANSCRIPTION ENCOUNTER (OUTPATIENT)
Age: 70
End: 2025-09-05

## 2025-09-05 VITALS
RESPIRATION RATE: 19 BRPM | OXYGEN SATURATION: 95 % | HEART RATE: 65 BPM | SYSTOLIC BLOOD PRESSURE: 134 MMHG | TEMPERATURE: 98 F | DIASTOLIC BLOOD PRESSURE: 77 MMHG

## 2025-09-05 VITALS
SYSTOLIC BLOOD PRESSURE: 170 MMHG | TEMPERATURE: 98 F | RESPIRATION RATE: 17 BRPM | OXYGEN SATURATION: 97 % | WEIGHT: 220.02 LBS | HEART RATE: 62 BPM | HEIGHT: 70 IN | DIASTOLIC BLOOD PRESSURE: 82 MMHG

## 2025-09-05 DIAGNOSIS — Z98.890 OTHER SPECIFIED POSTPROCEDURAL STATES: Chronic | ICD-10-CM

## 2025-09-05 DIAGNOSIS — Z98.89 OTHER SPECIFIED POSTPROCEDURAL STATES: Chronic | ICD-10-CM

## 2025-09-05 DIAGNOSIS — Z95.5 PRESENCE OF CORONARY ANGIOPLASTY IMPLANT AND GRAFT: Chronic | ICD-10-CM

## 2025-09-05 DIAGNOSIS — R07.89 OTHER CHEST PAIN: ICD-10-CM

## 2025-09-05 LAB
ANION GAP SERPL CALC-SCNC: 13 MMOL/L — SIGNIFICANT CHANGE UP (ref 5–17)
BUN SERPL-MCNC: 20 MG/DL — SIGNIFICANT CHANGE UP (ref 7–23)
CALCIUM SERPL-MCNC: 9.3 MG/DL — SIGNIFICANT CHANGE UP (ref 8.4–10.5)
CHLORIDE SERPL-SCNC: 101 MMOL/L — SIGNIFICANT CHANGE UP (ref 96–108)
CO2 SERPL-SCNC: 23 MMOL/L — SIGNIFICANT CHANGE UP (ref 22–31)
CREAT SERPL-MCNC: 0.96 MG/DL — SIGNIFICANT CHANGE UP (ref 0.5–1.3)
EGFR: 84 ML/MIN/1.73M2 — SIGNIFICANT CHANGE UP
EGFR: 84 ML/MIN/1.73M2 — SIGNIFICANT CHANGE UP
GLUCOSE BLDC GLUCOMTR-MCNC: 137 MG/DL — HIGH (ref 70–99)
GLUCOSE BLDC GLUCOMTR-MCNC: 153 MG/DL — HIGH (ref 70–99)
GLUCOSE SERPL-MCNC: 146 MG/DL — HIGH (ref 70–99)
HCT VFR BLD CALC: 46.1 % — SIGNIFICANT CHANGE UP (ref 39–50)
HGB BLD-MCNC: 15.6 G/DL — SIGNIFICANT CHANGE UP (ref 13–17)
MCHC RBC-ENTMCNC: 30.4 PG — SIGNIFICANT CHANGE UP (ref 27–34)
MCHC RBC-ENTMCNC: 33.8 G/DL — SIGNIFICANT CHANGE UP (ref 32–36)
MCV RBC AUTO: 89.7 FL — SIGNIFICANT CHANGE UP (ref 80–100)
NRBC # BLD AUTO: 0 K/UL — SIGNIFICANT CHANGE UP (ref 0–0)
NRBC # FLD: 0 K/UL — SIGNIFICANT CHANGE UP (ref 0–0)
NRBC BLD AUTO-RTO: 0 /100 WBCS — SIGNIFICANT CHANGE UP (ref 0–0)
PLATELET # BLD AUTO: 171 K/UL — SIGNIFICANT CHANGE UP (ref 150–400)
PMV BLD: 9.9 FL — SIGNIFICANT CHANGE UP (ref 7–13)
POTASSIUM SERPL-MCNC: 4 MMOL/L — SIGNIFICANT CHANGE UP (ref 3.5–5.3)
POTASSIUM SERPL-SCNC: 4 MMOL/L — SIGNIFICANT CHANGE UP (ref 3.5–5.3)
RBC # BLD: 5.14 M/UL — SIGNIFICANT CHANGE UP (ref 4.2–5.8)
RBC # FLD: 12.4 % — SIGNIFICANT CHANGE UP (ref 10.3–14.5)
SODIUM SERPL-SCNC: 137 MMOL/L — SIGNIFICANT CHANGE UP (ref 135–145)
WBC # BLD: 6.67 K/UL — SIGNIFICANT CHANGE UP (ref 3.8–10.5)
WBC # FLD AUTO: 6.67 K/UL — SIGNIFICANT CHANGE UP (ref 3.8–10.5)

## 2025-09-05 PROCEDURE — 93571 IV DOP VEL&/PRESS C FLO 1ST: CPT | Mod: 26,LD

## 2025-09-05 PROCEDURE — 93005 ELECTROCARDIOGRAM TRACING: CPT

## 2025-09-05 PROCEDURE — 93799 UNLISTED CV SVC/PROCEDURE: CPT

## 2025-09-05 PROCEDURE — 82962 GLUCOSE BLOOD TEST: CPT

## 2025-09-05 PROCEDURE — C9600: CPT | Mod: LD

## 2025-09-05 PROCEDURE — 92929: CPT | Mod: LD

## 2025-09-05 PROCEDURE — 85027 COMPLETE CBC AUTOMATED: CPT

## 2025-09-05 PROCEDURE — 92928 PRQ TCAT PLMT NTRAC ST 1 LES: CPT | Mod: LD

## 2025-09-05 PROCEDURE — C9601: CPT | Mod: LD

## 2025-09-05 PROCEDURE — 80048 BASIC METABOLIC PNL TOTAL CA: CPT

## 2025-09-05 PROCEDURE — 93454 CORONARY ARTERY ANGIO S&I: CPT | Mod: 59

## 2025-09-05 PROCEDURE — C1769: CPT

## 2025-09-05 PROCEDURE — 99152 MOD SED SAME PHYS/QHP 5/>YRS: CPT

## 2025-09-05 PROCEDURE — 93454 CORONARY ARTERY ANGIO S&I: CPT | Mod: 26,59

## 2025-09-05 PROCEDURE — C1887: CPT

## 2025-09-05 PROCEDURE — C1894: CPT

## 2025-09-05 PROCEDURE — C1874: CPT

## 2025-09-05 PROCEDURE — 93010 ELECTROCARDIOGRAM REPORT: CPT

## 2025-09-05 RX ORDER — DEXTROSE 50 % IN WATER 50 %
12.5 SYRINGE (ML) INTRAVENOUS ONCE
Refills: 0 | Status: ACTIVE | OUTPATIENT
Start: 2025-09-05

## 2025-09-05 RX ORDER — SODIUM CHLORIDE 9 G/1000ML
1000 INJECTION, SOLUTION INTRAVENOUS
Refills: 0 | Status: ACTIVE | OUTPATIENT
Start: 2025-09-05 | End: 2026-08-04

## 2025-09-05 RX ORDER — ACETAMINOPHEN 500 MG/5ML
1000 LIQUID (ML) ORAL ONCE
Refills: 0 | Status: COMPLETED | OUTPATIENT
Start: 2025-09-05 | End: 2025-09-05

## 2025-09-05 RX ORDER — B1/B2/B3/B5/B6/B12/VIT C/FOLIC 500-0.5 MG
1 TABLET ORAL
Refills: 0 | DISCHARGE

## 2025-09-05 RX ORDER — LOSARTAN POTASSIUM 100 MG/1
25 TABLET, FILM COATED ORAL DAILY
Refills: 0 | Status: ACTIVE | OUTPATIENT
Start: 2025-09-05 | End: 2026-08-04

## 2025-09-05 RX ORDER — OMEPRAZOLE 20 MG/1
1 CAPSULE, DELAYED RELEASE ORAL
Refills: 0 | DISCHARGE

## 2025-09-05 RX ORDER — ASPIRIN 325 MG
81 TABLET ORAL DAILY
Refills: 0 | Status: ACTIVE | OUTPATIENT
Start: 2025-09-05 | End: 2026-08-04

## 2025-09-05 RX ORDER — CLOPIDOGREL BISULFATE 75 MG/1
1 TABLET, FILM COATED ORAL
Qty: 90 | Refills: 3
Start: 2025-09-05 | End: 2026-08-30

## 2025-09-05 RX ORDER — DEXTROSE 50 % IN WATER 50 %
25 SYRINGE (ML) INTRAVENOUS ONCE
Refills: 0 | Status: ACTIVE | OUTPATIENT
Start: 2025-09-05

## 2025-09-05 RX ORDER — INSULIN LISPRO 100 U/ML
INJECTION, SOLUTION INTRAVENOUS; SUBCUTANEOUS
Refills: 0 | Status: ACTIVE | OUTPATIENT
Start: 2025-09-05 | End: 2026-08-04

## 2025-09-05 RX ORDER — GLUCAGON 3 MG/1
1 POWDER NASAL ONCE
Refills: 0 | Status: ACTIVE | OUTPATIENT
Start: 2025-09-05 | End: 2026-08-04

## 2025-09-05 RX ORDER — METOPROLOL SUCCINATE 50 MG/1
25 TABLET, EXTENDED RELEASE ORAL
Refills: 0 | Status: ACTIVE | OUTPATIENT
Start: 2025-09-05 | End: 2026-08-04

## 2025-09-05 RX ORDER — CLOPIDOGREL BISULFATE 75 MG/1
75 TABLET, FILM COATED ORAL DAILY
Refills: 0 | Status: ACTIVE | OUTPATIENT
Start: 2025-09-05 | End: 2026-08-04

## 2025-09-05 RX ORDER — ASPIRIN 325 MG
1 TABLET ORAL
Qty: 0 | Refills: 0 | DISCHARGE
Start: 2025-09-05

## 2025-09-05 RX ORDER — DEXTROSE 50 % IN WATER 50 %
15 SYRINGE (ML) INTRAVENOUS ONCE
Refills: 0 | Status: ACTIVE | OUTPATIENT
Start: 2025-09-05 | End: 2026-08-04

## 2025-09-05 RX ADMIN — Medication 75 MILLILITER(S): at 08:28

## 2025-09-05 RX ADMIN — Medication 1000 MILLIGRAM(S): at 12:05

## 2025-09-05 RX ADMIN — Medication 500 MILLILITER(S): at 08:29

## 2025-09-05 RX ADMIN — Medication 400 MILLIGRAM(S): at 11:04

## 2025-09-05 RX ADMIN — Medication 75 MILLILITER(S): at 11:05

## 2025-09-10 ENCOUNTER — APPOINTMENT (OUTPATIENT)
Dept: CARDIOLOGY | Facility: CLINIC | Age: 70
End: 2025-09-10
Payer: MEDICARE

## 2025-09-10 VITALS — SYSTOLIC BLOOD PRESSURE: 110 MMHG | DIASTOLIC BLOOD PRESSURE: 64 MMHG

## 2025-09-10 VITALS
HEART RATE: 76 BPM | SYSTOLIC BLOOD PRESSURE: 128 MMHG | OXYGEN SATURATION: 98 % | DIASTOLIC BLOOD PRESSURE: 60 MMHG | HEIGHT: 70 IN | WEIGHT: 216 LBS | BODY MASS INDEX: 30.92 KG/M2

## 2025-09-10 DIAGNOSIS — Z95.5 PRESENCE OF CORONARY ANGIOPLASTY IMPLANT AND GRAFT: ICD-10-CM

## 2025-09-10 DIAGNOSIS — I25.10 ATHEROSCLEROTIC HEART DISEASE OF NATIVE CORONARY ARTERY W/OUT ANGINA PECTORIS: ICD-10-CM

## 2025-09-10 DIAGNOSIS — I48.0 PAROXYSMAL ATRIAL FIBRILLATION: ICD-10-CM

## 2025-09-10 PROCEDURE — 99214 OFFICE O/P EST MOD 30 MIN: CPT

## 2025-09-10 PROCEDURE — G2211 COMPLEX E/M VISIT ADD ON: CPT

## 2025-09-10 RX ORDER — PANTOPRAZOLE 40 MG/1
40 TABLET, DELAYED RELEASE ORAL DAILY
Qty: 90 | Refills: 3 | Status: ACTIVE | COMMUNITY
Start: 2025-09-10 | End: 1900-01-01

## 2025-09-10 RX ORDER — CLOPIDOGREL 75 MG/1
75 TABLET, FILM COATED ORAL
Refills: 0 | Status: ACTIVE | COMMUNITY
Start: 2025-09-10

## 2025-09-10 RX ORDER — ASPIRIN 81 MG/1
81 TABLET, DELAYED RELEASE ORAL
Refills: 0 | Status: ACTIVE | COMMUNITY
Start: 2025-09-10

## (undated) DEVICE — PACK IV START WITH CHG

## (undated) DEVICE — DENTURE CUP PINK

## (undated) DEVICE — TUBING MEDI-VAC W MAXIGRIP CONNECTORS 1/4"X6'

## (undated) DEVICE — CATH IV SAFE BC 22G X 1" (BLUE)

## (undated) DEVICE — GOWN LG

## (undated) DEVICE — TUBING IV SET GRAVITY 3Y 100" MACRO

## (undated) DEVICE — CONTAINER FORMALIN 80ML YELLOW

## (undated) DEVICE — LUBRICATING JELLY HR ONE SHOT 3G

## (undated) DEVICE — DRSG BANDAID 0.75X3"

## (undated) DEVICE — DRSG CURITY GAUZE SPONGE 4 X 4" 12-PLY NON-STERILE

## (undated) DEVICE — BIOPSY FORCEP RADIAL JAW 4 STANDARD WITH NEEDLE

## (undated) DEVICE — ELCTR GROUNDING PAD ADULT COVIDIEN

## (undated) DEVICE — UNDERPAD LINEN SAVER 17 X 24"

## (undated) DEVICE — ELCTR ECG CONDUCTIVE ADHESIVE

## (undated) DEVICE — BIOPSY FORCEP COLD DISP

## (undated) DEVICE — BASIN EMESIS 10IN GRADUATED MAUVE

## (undated) DEVICE — BITE BLOCK ADULT 20 X 27MM (GREEN)

## (undated) DEVICE — DRSG 2X2

## (undated) DEVICE — TUBING SUCTION NONCONDUCTIVE 6MM X 12FT

## (undated) DEVICE — POLY TRAP ETRAP

## (undated) DEVICE — SALIVA EJECTOR (BLUE)

## (undated) DEVICE — SNARE LESIONHUNTER ROTAT NITNL COLD 10MM

## (undated) DEVICE — CLAMP BX HOT RAD JAW 3